# Patient Record
Sex: FEMALE | Race: WHITE | Employment: OTHER | URBAN - NONMETROPOLITAN AREA
[De-identification: names, ages, dates, MRNs, and addresses within clinical notes are randomized per-mention and may not be internally consistent; named-entity substitution may affect disease eponyms.]

---

## 2017-07-07 ENCOUNTER — OFFICE VISIT (OUTPATIENT)
Dept: UROLOGY | Age: 72
End: 2017-07-07

## 2017-07-07 VITALS
BODY MASS INDEX: 35.05 KG/M2 | DIASTOLIC BLOOD PRESSURE: 78 MMHG | WEIGHT: 167 LBS | SYSTOLIC BLOOD PRESSURE: 140 MMHG | HEIGHT: 58 IN

## 2017-07-07 DIAGNOSIS — N13.5 UPJ OBSTRUCTION, ACQUIRED: ICD-10-CM

## 2017-07-07 DIAGNOSIS — N20.0 KIDNEY STONE: Primary | ICD-10-CM

## 2017-07-07 LAB
BILIRUBIN URINE: NEGATIVE
BLOOD URINE, POC: NORMAL
CHARACTER, URINE: CLEAR
COLOR, URINE: YELLOW
GLUCOSE URINE: NEGATIVE MG/DL
KETONES, URINE: NEGATIVE
LEUKOCYTE CLUMPS, URINE: NEGATIVE
NITRITE, URINE: NEGATIVE
PH, URINE: 5.5
PROTEIN, URINE: NEGATIVE MG/DL
SPECIFIC GRAVITY, URINE: <= 1.005 (ref 1–1.03)
UROBILINOGEN, URINE: 0.2 EU/DL

## 2017-07-07 PROCEDURE — G8427 DOCREV CUR MEDS BY ELIG CLIN: HCPCS | Performed by: UROLOGY

## 2017-07-07 PROCEDURE — 3017F COLORECTAL CA SCREEN DOC REV: CPT | Performed by: UROLOGY

## 2017-07-07 PROCEDURE — 4040F PNEUMOC VAC/ADMIN/RCVD: CPT | Performed by: UROLOGY

## 2017-07-07 PROCEDURE — 1036F TOBACCO NON-USER: CPT | Performed by: UROLOGY

## 2017-07-07 PROCEDURE — G8417 CALC BMI ABV UP PARAM F/U: HCPCS | Performed by: UROLOGY

## 2017-07-07 PROCEDURE — G8400 PT W/DXA NO RESULTS DOC: HCPCS | Performed by: UROLOGY

## 2017-07-07 PROCEDURE — 3014F SCREEN MAMMO DOC REV: CPT | Performed by: UROLOGY

## 2017-07-07 PROCEDURE — 1123F ACP DISCUSS/DSCN MKR DOCD: CPT | Performed by: UROLOGY

## 2017-07-07 PROCEDURE — 81003 URINALYSIS AUTO W/O SCOPE: CPT | Performed by: UROLOGY

## 2017-07-07 PROCEDURE — 1090F PRES/ABSN URINE INCON ASSESS: CPT | Performed by: UROLOGY

## 2017-07-07 PROCEDURE — 99203 OFFICE O/P NEW LOW 30 MIN: CPT | Performed by: UROLOGY

## 2017-07-07 RX ORDER — AMOXICILLIN 500 MG
CAPSULE ORAL EVERY MORNING
COMMUNITY

## 2017-07-07 RX ORDER — TURMERIC 95 %
POWDER (GRAM) MISCELLANEOUS EVERY OTHER DAY
COMMUNITY
End: 2022-02-21

## 2017-07-07 ASSESSMENT — ENCOUNTER SYMPTOMS
SHORTNESS OF BREATH: 0
ABDOMINAL PAIN: 0
EYE PAIN: 0
VOMITING: 0
FACIAL SWELLING: 0
EYE REDNESS: 0
CHEST TIGHTNESS: 0
COLOR CHANGE: 0

## 2017-07-14 LAB — CREAT SERPL-MCNC: 0.75 MG/DL

## 2017-07-20 ENCOUNTER — HOSPITAL ENCOUNTER (OUTPATIENT)
Dept: NUCLEAR MEDICINE | Age: 72
Discharge: HOME | End: 2017-07-20
Payer: MEDICARE

## 2017-07-20 ENCOUNTER — HOSPITAL ENCOUNTER (OUTPATIENT)
Age: 72
Discharge: HOME | End: 2017-07-20
Payer: MEDICARE

## 2017-07-20 ENCOUNTER — HOSPITAL ENCOUNTER (OUTPATIENT)
Dept: GENERAL RADIOLOGY | Age: 72
Discharge: HOME | End: 2017-07-20
Payer: MEDICARE

## 2017-07-20 VITALS — WEIGHT: 160 LBS | HEIGHT: 58 IN | BODY MASS INDEX: 33.58 KG/M2

## 2017-07-20 DIAGNOSIS — N20.0 KIDNEY STONE: ICD-10-CM

## 2017-07-20 DIAGNOSIS — N13.5 UPJ OBSTRUCTION, ACQUIRED: ICD-10-CM

## 2017-07-20 PROCEDURE — 3430000000 HC RX DIAGNOSTIC RADIOPHARMACEUTICAL: Performed by: UROLOGY

## 2017-07-20 PROCEDURE — 78708 K FLOW/FUNCT IMAGE W/DRUG: CPT

## 2017-07-20 PROCEDURE — 6360000002 HC RX W HCPCS

## 2017-07-20 PROCEDURE — 74000 XR ABDOMEN LIMITED (KUB): CPT

## 2017-07-20 PROCEDURE — A9562 TC99M MERTIATIDE: HCPCS | Performed by: UROLOGY

## 2017-07-20 RX ORDER — FUROSEMIDE 10 MG/ML
40 INJECTION INTRAMUSCULAR; INTRAVENOUS ONCE
Status: COMPLETED | OUTPATIENT
Start: 2017-07-20 | End: 2017-07-20

## 2017-07-20 RX ADMIN — Medication 10.3 MILLICURIE: at 07:58

## 2017-07-20 RX ADMIN — FUROSEMIDE 40 MG: 10 INJECTION INTRAMUSCULAR; INTRAVENOUS at 08:11

## 2017-07-21 ENCOUNTER — OFFICE VISIT (OUTPATIENT)
Dept: UROLOGY | Age: 72
End: 2017-07-21
Payer: MEDICARE

## 2017-07-21 ENCOUNTER — TELEPHONE (OUTPATIENT)
Dept: UROLOGY | Age: 72
End: 2017-07-21

## 2017-07-21 VITALS
SYSTOLIC BLOOD PRESSURE: 132 MMHG | BODY MASS INDEX: 34.85 KG/M2 | HEIGHT: 58 IN | DIASTOLIC BLOOD PRESSURE: 66 MMHG | WEIGHT: 166 LBS

## 2017-07-21 DIAGNOSIS — Z01.818 PRE-OP TESTING: ICD-10-CM

## 2017-07-21 DIAGNOSIS — N20.0 KIDNEY STONE: Primary | ICD-10-CM

## 2017-07-21 LAB
BILIRUBIN URINE: NEGATIVE
BLOOD URINE, POC: NORMAL
CHARACTER, URINE: CLEAR
COLOR, URINE: YELLOW
GLUCOSE URINE: NEGATIVE MG/DL
KETONES, URINE: NEGATIVE
LEUKOCYTE CLUMPS, URINE: NEGATIVE
NITRITE, URINE: NEGATIVE
PH, URINE: 6
PROTEIN, URINE: NEGATIVE MG/DL
SPECIFIC GRAVITY, URINE: 1.02 (ref 1–1.03)
UROBILINOGEN, URINE: 0.2 EU/DL

## 2017-07-21 PROCEDURE — G8427 DOCREV CUR MEDS BY ELIG CLIN: HCPCS | Performed by: UROLOGY

## 2017-07-21 PROCEDURE — G8400 PT W/DXA NO RESULTS DOC: HCPCS | Performed by: UROLOGY

## 2017-07-21 PROCEDURE — 1123F ACP DISCUSS/DSCN MKR DOCD: CPT | Performed by: UROLOGY

## 2017-07-21 PROCEDURE — 99213 OFFICE O/P EST LOW 20 MIN: CPT | Performed by: UROLOGY

## 2017-07-21 PROCEDURE — 1090F PRES/ABSN URINE INCON ASSESS: CPT | Performed by: UROLOGY

## 2017-07-21 PROCEDURE — 3014F SCREEN MAMMO DOC REV: CPT | Performed by: UROLOGY

## 2017-07-21 PROCEDURE — G8417 CALC BMI ABV UP PARAM F/U: HCPCS | Performed by: UROLOGY

## 2017-07-21 PROCEDURE — 4040F PNEUMOC VAC/ADMIN/RCVD: CPT | Performed by: UROLOGY

## 2017-07-21 PROCEDURE — 81003 URINALYSIS AUTO W/O SCOPE: CPT | Performed by: UROLOGY

## 2017-07-21 PROCEDURE — 1036F TOBACCO NON-USER: CPT | Performed by: UROLOGY

## 2017-07-21 PROCEDURE — 3017F COLORECTAL CA SCREEN DOC REV: CPT | Performed by: UROLOGY

## 2017-07-25 ENCOUNTER — TELEPHONE (OUTPATIENT)
Dept: UROLOGY | Age: 72
End: 2017-07-25

## 2017-07-25 DIAGNOSIS — N20.0 KIDNEY STONE: Primary | ICD-10-CM

## 2017-07-27 ENCOUNTER — TELEPHONE (OUTPATIENT)
Dept: UROLOGY | Age: 72
End: 2017-07-27

## 2017-07-28 ENCOUNTER — HOSPITAL ENCOUNTER (OUTPATIENT)
Age: 72
Discharge: HOME | End: 2017-07-28
Payer: MEDICARE

## 2017-07-28 DIAGNOSIS — N20.0 KIDNEY STONE: ICD-10-CM

## 2017-07-28 DIAGNOSIS — Z01.818 PRE-OP TESTING: ICD-10-CM

## 2017-07-28 LAB
BASOPHILS ABSOLUTE: 100 /ΜL
BASOPHILS RELATIVE PERCENT: 0.8 %
BILIRUBIN URINE: NORMAL MG/DL
BLOOD, URINE: NEGATIVE
BUN BLDV-MCNC: 17 MG/DL
CALCIUM SERPL-MCNC: 9.8 MG/DL
CHLORIDE BLD-SCNC: 105 MMOL/L
CLARITY: CLEAR
CO2: 26 MMOL/L
COLOR: NORMAL
CREAT SERPL-MCNC: 0.72 MG/DL
EOSINOPHILS ABSOLUTE: 200 /ΜL
EOSINOPHILS RELATIVE PERCENT: 3.2 %
GFR CALCULATED: NORMAL
GLUCOSE BLD-MCNC: 86 MG/DL
GLUCOSE URINE: NEGATIVE
HCT VFR BLD CALC: 44.3 % (ref 36–46)
HEMOGLOBIN: 14.6 G/DL (ref 12–16)
KETONES, URINE: NEGATIVE
LEUKOCYTE ESTERASE, URINE: NEGATIVE
LYMPHOCYTES ABSOLUTE: 2100 /ΜL
LYMPHOCYTES RELATIVE PERCENT: 29.2 %
MCH RBC QN AUTO: 28.5 PG
MCHC RBC AUTO-ENTMCNC: 33 G/DL
MCV RBC AUTO: 86.4 FL
MONOCYTES ABSOLUTE: 800 /ΜL
MONOCYTES RELATIVE PERCENT: 10.6 %
NEUTROPHILS ABSOLUTE: 4100 /ΜL
NEUTROPHILS RELATIVE PERCENT: 56.2 %
NITRITE, URINE: NEGATIVE
PDW BLD-RTO: 14.1 %
PH UA: 5 (ref 4.5–8)
PLATELET # BLD: 197 K/ΜL
PMV BLD AUTO: NORMAL FL
POTASSIUM SERPL-SCNC: 4 MMOL/L
PROTEIN UA: NEGATIVE
RBC # BLD: 5.13 10^6/ΜL
SODIUM BLD-SCNC: 137 MMOL/L
SPECIFIC GRAVITY UA: 1.01 (ref 1–1.03)
UROBILINOGEN, URINE: NORMAL
WBC # BLD: 7.2 10^3/ML

## 2017-07-28 PROCEDURE — 93005 ELECTROCARDIOGRAM TRACING: CPT

## 2017-07-29 LAB
EKG ATRIAL RATE: 67 BPM
EKG P AXIS: 55 DEGREES
EKG P-R INTERVAL: 140 MS
EKG Q-T INTERVAL: 360 MS
EKG QRS DURATION: 78 MS
EKG QTC CALCULATION (BAZETT): 380 MS
EKG R AXIS: 14 DEGREES
EKG T AXIS: 39 DEGREES
EKG VENTRICULAR RATE: 67 BPM

## 2017-08-01 ENCOUNTER — TELEPHONE (OUTPATIENT)
Dept: UROLOGY | Age: 72
End: 2017-08-01

## 2017-08-03 ENCOUNTER — ANESTHESIA EVENT (OUTPATIENT)
Dept: OPERATING ROOM | Age: 72
End: 2017-08-03
Payer: MEDICARE

## 2017-08-03 ENCOUNTER — HOSPITAL ENCOUNTER (OUTPATIENT)
Age: 72
Setting detail: OUTPATIENT SURGERY
Discharge: HOME | End: 2017-08-03
Attending: UROLOGY | Admitting: UROLOGY
Payer: MEDICARE

## 2017-08-03 ENCOUNTER — ANESTHESIA (OUTPATIENT)
Dept: OPERATING ROOM | Age: 72
End: 2017-08-03
Payer: MEDICARE

## 2017-08-03 VITALS
WEIGHT: 167 LBS | DIASTOLIC BLOOD PRESSURE: 63 MMHG | RESPIRATION RATE: 16 BRPM | TEMPERATURE: 97.2 F | HEART RATE: 56 BPM | SYSTOLIC BLOOD PRESSURE: 128 MMHG | BODY MASS INDEX: 35.05 KG/M2 | OXYGEN SATURATION: 93 % | HEIGHT: 58 IN

## 2017-08-03 VITALS
RESPIRATION RATE: 3 BRPM | TEMPERATURE: 96.8 F | SYSTOLIC BLOOD PRESSURE: 78 MMHG | OXYGEN SATURATION: 98 % | DIASTOLIC BLOOD PRESSURE: 41 MMHG

## 2017-08-03 DIAGNOSIS — N20.0 KIDNEY STONE: Primary | ICD-10-CM

## 2017-08-03 LAB — INR BLD: 0.94 (ref 0.85–1.13)

## 2017-08-03 PROCEDURE — 3700000000 HC ANESTHESIA ATTENDED CARE: Performed by: UROLOGY

## 2017-08-03 PROCEDURE — 7100000000 HC PACU RECOVERY - FIRST 15 MIN: Performed by: UROLOGY

## 2017-08-03 PROCEDURE — 6360000002 HC RX W HCPCS: Performed by: NURSE ANESTHETIST, CERTIFIED REGISTERED

## 2017-08-03 PROCEDURE — 7100000010 HC PHASE II RECOVERY - FIRST 15 MIN: Performed by: UROLOGY

## 2017-08-03 PROCEDURE — 2580000003 HC RX 258: Performed by: UROLOGY

## 2017-08-03 PROCEDURE — 3600000002 HC SURGERY LEVEL 2 BASE: Performed by: UROLOGY

## 2017-08-03 PROCEDURE — 36415 COLL VENOUS BLD VENIPUNCTURE: CPT

## 2017-08-03 PROCEDURE — 6360000002 HC RX W HCPCS: Performed by: UROLOGY

## 2017-08-03 PROCEDURE — 3700000001 HC ADD 15 MINUTES (ANESTHESIA): Performed by: UROLOGY

## 2017-08-03 PROCEDURE — 7100000011 HC PHASE II RECOVERY - ADDTL 15 MIN: Performed by: UROLOGY

## 2017-08-03 PROCEDURE — 50590 FRAGMENTING OF KIDNEY STONE: CPT | Performed by: UROLOGY

## 2017-08-03 PROCEDURE — 3600000012 HC SURGERY LEVEL 2 ADDTL 15MIN: Performed by: UROLOGY

## 2017-08-03 PROCEDURE — 7100000001 HC PACU RECOVERY - ADDTL 15 MIN: Performed by: UROLOGY

## 2017-08-03 PROCEDURE — 85610 PROTHROMBIN TIME: CPT

## 2017-08-03 RX ORDER — PROPOFOL 10 MG/ML
INJECTION, EMULSION INTRAVENOUS PRN
Status: DISCONTINUED | OUTPATIENT
Start: 2017-08-03 | End: 2017-08-03 | Stop reason: SDUPTHER

## 2017-08-03 RX ORDER — CIPROFLOXACIN 2 MG/ML
400 INJECTION, SOLUTION INTRAVENOUS ONCE
Status: COMPLETED | OUTPATIENT
Start: 2017-08-03 | End: 2017-08-03

## 2017-08-03 RX ORDER — SODIUM CHLORIDE 9 MG/ML
INJECTION, SOLUTION INTRAVENOUS CONTINUOUS
Status: DISCONTINUED | OUTPATIENT
Start: 2017-08-03 | End: 2017-08-03 | Stop reason: HOSPADM

## 2017-08-03 RX ORDER — HYDROCHLOROTHIAZIDE 12.5 MG/1
12.5 TABLET ORAL DAILY
Qty: 30 TABLET | Refills: 0 | Status: SHIPPED | OUTPATIENT
Start: 2017-08-03 | End: 2022-02-21

## 2017-08-03 RX ORDER — FENTANYL CITRATE 50 UG/ML
INJECTION, SOLUTION INTRAMUSCULAR; INTRAVENOUS PRN
Status: DISCONTINUED | OUTPATIENT
Start: 2017-08-03 | End: 2017-08-03 | Stop reason: SDUPTHER

## 2017-08-03 RX ORDER — HYDROCODONE BITARTRATE AND ACETAMINOPHEN 5; 325 MG/1; MG/1
1 TABLET ORAL EVERY 6 HOURS PRN
Qty: 20 TABLET | Refills: 0 | Status: SHIPPED | OUTPATIENT
Start: 2017-08-03 | End: 2017-08-10

## 2017-08-03 RX ORDER — ONDANSETRON 2 MG/ML
INJECTION INTRAMUSCULAR; INTRAVENOUS PRN
Status: DISCONTINUED | OUTPATIENT
Start: 2017-08-03 | End: 2017-08-03 | Stop reason: SDUPTHER

## 2017-08-03 RX ORDER — DEXAMETHASONE SODIUM PHOSPHATE 4 MG/ML
INJECTION, SOLUTION INTRA-ARTICULAR; INTRALESIONAL; INTRAMUSCULAR; INTRAVENOUS; SOFT TISSUE PRN
Status: DISCONTINUED | OUTPATIENT
Start: 2017-08-03 | End: 2017-08-03 | Stop reason: SDUPTHER

## 2017-08-03 RX ADMIN — FENTANYL CITRATE 50 MCG: 50 INJECTION INTRAMUSCULAR; INTRAVENOUS at 12:15

## 2017-08-03 RX ADMIN — PHENYLEPHRINE HYDROCHLORIDE 100 MCG: 10 INJECTION INTRAMUSCULAR; INTRAVENOUS; SUBCUTANEOUS at 12:15

## 2017-08-03 RX ADMIN — PHENYLEPHRINE HYDROCHLORIDE 100 MCG: 10 INJECTION INTRAMUSCULAR; INTRAVENOUS; SUBCUTANEOUS at 12:08

## 2017-08-03 RX ADMIN — DEXAMETHASONE SODIUM PHOSPHATE 4 MG: 4 INJECTION, SOLUTION INTRAMUSCULAR; INTRAVENOUS at 12:04

## 2017-08-03 RX ADMIN — SODIUM CHLORIDE: 9 INJECTION, SOLUTION INTRAVENOUS at 11:50

## 2017-08-03 RX ADMIN — ONDANSETRON 4 MG: 2 INJECTION INTRAMUSCULAR; INTRAVENOUS at 12:04

## 2017-08-03 RX ADMIN — LIDOCAINE HYDROCHLORIDE 60 MG: 20 INJECTION, SOLUTION INTRAVENOUS at 11:59

## 2017-08-03 RX ADMIN — PHENYLEPHRINE HYDROCHLORIDE 50 MCG: 10 INJECTION INTRAMUSCULAR; INTRAVENOUS; SUBCUTANEOUS at 12:11

## 2017-08-03 RX ADMIN — FENTANYL CITRATE 25 MCG: 50 INJECTION INTRAMUSCULAR; INTRAVENOUS at 12:04

## 2017-08-03 RX ADMIN — FENTANYL CITRATE 25 MCG: 50 INJECTION INTRAMUSCULAR; INTRAVENOUS at 12:01

## 2017-08-03 RX ADMIN — CIPROFLOXACIN 400 MG: 2 INJECTION, SOLUTION INTRAVENOUS at 12:02

## 2017-08-03 RX ADMIN — PROPOFOL 150 MG: 10 INJECTION, EMULSION INTRAVENOUS at 11:59

## 2017-08-03 RX ADMIN — PHENYLEPHRINE HYDROCHLORIDE 100 MCG: 10 INJECTION INTRAMUSCULAR; INTRAVENOUS; SUBCUTANEOUS at 12:14

## 2017-08-03 ASSESSMENT — PULMONARY FUNCTION TESTS
PIF_VALUE: 4
PIF_VALUE: 2
PIF_VALUE: 2
PIF_VALUE: 6
PIF_VALUE: 4
PIF_VALUE: 2
PIF_VALUE: 4
PIF_VALUE: 0
PIF_VALUE: 4
PIF_VALUE: 2
PIF_VALUE: 4
PIF_VALUE: 3
PIF_VALUE: 4
PIF_VALUE: 13
PIF_VALUE: 3
PIF_VALUE: 13
PIF_VALUE: 14
PIF_VALUE: 5
PIF_VALUE: 1
PIF_VALUE: 1
PIF_VALUE: 5
PIF_VALUE: 4
PIF_VALUE: 0
PIF_VALUE: 4
PIF_VALUE: 3
PIF_VALUE: 4
PIF_VALUE: 4
PIF_VALUE: 1
PIF_VALUE: 4
PIF_VALUE: 2

## 2017-08-03 ASSESSMENT — PAIN SCALES - WONG BAKER: WONGBAKER_NUMERICALRESPONSE: 0

## 2017-08-03 ASSESSMENT — PAIN SCALES - GENERAL
PAINLEVEL_OUTOF10: 0

## 2017-08-03 ASSESSMENT — PAIN - FUNCTIONAL ASSESSMENT: PAIN_FUNCTIONAL_ASSESSMENT: 0-10

## 2017-08-15 ENCOUNTER — HOSPITAL ENCOUNTER (OUTPATIENT)
Dept: GENERAL RADIOLOGY | Age: 72
Discharge: HOME | End: 2017-08-15
Payer: MEDICARE

## 2017-08-15 ENCOUNTER — HOSPITAL ENCOUNTER (OUTPATIENT)
Age: 72
Discharge: HOME | End: 2017-08-15
Payer: MEDICARE

## 2017-08-15 DIAGNOSIS — N20.0 KIDNEY STONE: ICD-10-CM

## 2017-08-15 PROCEDURE — 74000 XR ABDOMEN LIMITED (KUB): CPT

## 2017-08-16 ENCOUNTER — OFFICE VISIT (OUTPATIENT)
Dept: UROLOGY | Age: 72
End: 2017-08-16
Payer: MEDICARE

## 2017-08-16 VITALS
HEIGHT: 58 IN | WEIGHT: 166 LBS | SYSTOLIC BLOOD PRESSURE: 118 MMHG | BODY MASS INDEX: 34.85 KG/M2 | DIASTOLIC BLOOD PRESSURE: 78 MMHG

## 2017-08-16 DIAGNOSIS — N20.0 KIDNEY STONE: Primary | ICD-10-CM

## 2017-08-16 LAB
BILIRUBIN URINE: NEGATIVE
BLOOD URINE, POC: NORMAL
CHARACTER, URINE: CLEAR
COLOR, URINE: YELLOW
GLUCOSE URINE: NEGATIVE MG/DL
KETONES, URINE: NEGATIVE
LEUKOCYTE CLUMPS, URINE: NEGATIVE
NITRITE, URINE: NEGATIVE
PH, URINE: 6
PROTEIN, URINE: NEGATIVE MG/DL
SPECIFIC GRAVITY, URINE: <= 1.005 (ref 1–1.03)
UROBILINOGEN, URINE: 0.2 EU/DL

## 2017-08-16 PROCEDURE — 99024 POSTOP FOLLOW-UP VISIT: CPT | Performed by: UROLOGY

## 2017-08-16 PROCEDURE — 81003 URINALYSIS AUTO W/O SCOPE: CPT | Performed by: UROLOGY

## 2017-08-17 ENCOUNTER — TELEPHONE (OUTPATIENT)
Dept: UROLOGY | Age: 72
End: 2017-08-17

## 2017-08-18 ENCOUNTER — TELEPHONE (OUTPATIENT)
Dept: UROLOGY | Age: 72
End: 2017-08-18

## 2017-08-18 DIAGNOSIS — R31.9 HEMATURIA: ICD-10-CM

## 2017-08-18 DIAGNOSIS — N20.0 KIDNEY STONE: Primary | ICD-10-CM

## 2017-08-18 DIAGNOSIS — Z01.818 PRE-OP TESTING: ICD-10-CM

## 2017-09-01 ENCOUNTER — ANESTHESIA (OUTPATIENT)
Dept: OPERATING ROOM | Age: 72
End: 2017-09-01
Payer: MEDICARE

## 2017-09-01 ENCOUNTER — HOSPITAL ENCOUNTER (OUTPATIENT)
Age: 72
Setting detail: OUTPATIENT SURGERY
Discharge: HOME | End: 2017-09-01
Attending: UROLOGY | Admitting: UROLOGY
Payer: MEDICARE

## 2017-09-01 ENCOUNTER — HOSPITAL ENCOUNTER (OUTPATIENT)
Age: 72
Discharge: HOME | End: 2017-09-01
Payer: MEDICARE

## 2017-09-01 ENCOUNTER — HOSPITAL ENCOUNTER (OUTPATIENT)
Dept: GENERAL RADIOLOGY | Age: 72
Discharge: HOME | End: 2017-09-01
Payer: MEDICARE

## 2017-09-01 ENCOUNTER — ANESTHESIA EVENT (OUTPATIENT)
Dept: OPERATING ROOM | Age: 72
End: 2017-09-01
Payer: MEDICARE

## 2017-09-01 VITALS
DIASTOLIC BLOOD PRESSURE: 60 MMHG | RESPIRATION RATE: 16 BRPM | HEIGHT: 58 IN | SYSTOLIC BLOOD PRESSURE: 126 MMHG | OXYGEN SATURATION: 98 % | BODY MASS INDEX: 34.68 KG/M2 | WEIGHT: 165.2 LBS | TEMPERATURE: 98.2 F | HEART RATE: 56 BPM

## 2017-09-01 VITALS
OXYGEN SATURATION: 98 % | SYSTOLIC BLOOD PRESSURE: 90 MMHG | DIASTOLIC BLOOD PRESSURE: 44 MMHG | TEMPERATURE: 98.6 F | RESPIRATION RATE: 11 BRPM

## 2017-09-01 DIAGNOSIS — N20.0 KIDNEY STONE: Primary | ICD-10-CM

## 2017-09-01 DIAGNOSIS — Z01.818 PRE-OP TESTING: ICD-10-CM

## 2017-09-01 DIAGNOSIS — N20.0 KIDNEY STONE: ICD-10-CM

## 2017-09-01 DIAGNOSIS — R31.9 HEMATURIA: ICD-10-CM

## 2017-09-01 LAB
INR BLD: 0.91 (ref 0.85–1.13)
POTASSIUM SERPL-SCNC: 3.8 MEQ/L (ref 3.5–5.2)

## 2017-09-01 PROCEDURE — 7100000010 HC PHASE II RECOVERY - FIRST 15 MIN: Performed by: UROLOGY

## 2017-09-01 PROCEDURE — 2500000003 HC RX 250 WO HCPCS: Performed by: NURSE ANESTHETIST, CERTIFIED REGISTERED

## 2017-09-01 PROCEDURE — 36415 COLL VENOUS BLD VENIPUNCTURE: CPT

## 2017-09-01 PROCEDURE — 7100000011 HC PHASE II RECOVERY - ADDTL 15 MIN: Performed by: UROLOGY

## 2017-09-01 PROCEDURE — 74000 XR ABDOMEN LIMITED (KUB): CPT

## 2017-09-01 PROCEDURE — 3700000000 HC ANESTHESIA ATTENDED CARE: Performed by: UROLOGY

## 2017-09-01 PROCEDURE — 7100000001 HC PACU RECOVERY - ADDTL 15 MIN: Performed by: UROLOGY

## 2017-09-01 PROCEDURE — 85610 PROTHROMBIN TIME: CPT

## 2017-09-01 PROCEDURE — 84132 ASSAY OF SERUM POTASSIUM: CPT

## 2017-09-01 PROCEDURE — 6360000002 HC RX W HCPCS: Performed by: ANESTHESIOLOGY

## 2017-09-01 PROCEDURE — 3600000012 HC SURGERY LEVEL 2 ADDTL 15MIN: Performed by: UROLOGY

## 2017-09-01 PROCEDURE — 50590 FRAGMENTING OF KIDNEY STONE: CPT | Performed by: UROLOGY

## 2017-09-01 PROCEDURE — 2580000003 HC RX 258: Performed by: UROLOGY

## 2017-09-01 PROCEDURE — 7100000000 HC PACU RECOVERY - FIRST 15 MIN: Performed by: UROLOGY

## 2017-09-01 PROCEDURE — 6360000002 HC RX W HCPCS: Performed by: NURSE ANESTHETIST, CERTIFIED REGISTERED

## 2017-09-01 PROCEDURE — 3700000001 HC ADD 15 MINUTES (ANESTHESIA): Performed by: UROLOGY

## 2017-09-01 PROCEDURE — 6360000002 HC RX W HCPCS: Performed by: UROLOGY

## 2017-09-01 PROCEDURE — 3600000002 HC SURGERY LEVEL 2 BASE: Performed by: UROLOGY

## 2017-09-01 RX ORDER — LIDOCAINE HYDROCHLORIDE 20 MG/ML
INJECTION, SOLUTION EPIDURAL; INFILTRATION; INTRACAUDAL; PERINEURAL PRN
Status: DISCONTINUED | OUTPATIENT
Start: 2017-09-01 | End: 2017-09-01 | Stop reason: SDUPTHER

## 2017-09-01 RX ORDER — DIPHENHYDRAMINE HYDROCHLORIDE 50 MG/ML
12.5 INJECTION INTRAMUSCULAR; INTRAVENOUS
Status: DISCONTINUED | OUTPATIENT
Start: 2017-09-01 | End: 2017-09-01

## 2017-09-01 RX ORDER — FENTANYL CITRATE 50 UG/ML
50 INJECTION, SOLUTION INTRAMUSCULAR; INTRAVENOUS EVERY 5 MIN PRN
Status: DISCONTINUED | OUTPATIENT
Start: 2017-09-01 | End: 2017-09-01

## 2017-09-01 RX ORDER — MEPERIDINE HYDROCHLORIDE 25 MG/ML
25 INJECTION INTRAMUSCULAR; INTRAVENOUS; SUBCUTANEOUS
Status: DISCONTINUED | OUTPATIENT
Start: 2017-09-01 | End: 2017-09-01

## 2017-09-01 RX ORDER — ONDANSETRON 2 MG/ML
INJECTION INTRAMUSCULAR; INTRAVENOUS PRN
Status: DISCONTINUED | OUTPATIENT
Start: 2017-09-01 | End: 2017-09-01 | Stop reason: SDUPTHER

## 2017-09-01 RX ORDER — SODIUM CHLORIDE 9 MG/ML
INJECTION, SOLUTION INTRAVENOUS CONTINUOUS
Status: DISCONTINUED | OUTPATIENT
Start: 2017-09-01 | End: 2017-09-01 | Stop reason: HOSPADM

## 2017-09-01 RX ORDER — DEXAMETHASONE SODIUM PHOSPHATE 4 MG/ML
INJECTION, SOLUTION INTRA-ARTICULAR; INTRALESIONAL; INTRAMUSCULAR; INTRAVENOUS; SOFT TISSUE PRN
Status: DISCONTINUED | OUTPATIENT
Start: 2017-09-01 | End: 2017-09-01 | Stop reason: SDUPTHER

## 2017-09-01 RX ORDER — HYDROCODONE BITARTRATE AND ACETAMINOPHEN 5; 325 MG/1; MG/1
1 TABLET ORAL EVERY 6 HOURS PRN
Qty: 20 TABLET | Refills: 0 | Status: SHIPPED | OUTPATIENT
Start: 2017-09-01 | End: 2017-09-08

## 2017-09-01 RX ORDER — PROMETHAZINE HYDROCHLORIDE 25 MG/ML
12.5 INJECTION, SOLUTION INTRAMUSCULAR; INTRAVENOUS
Status: DISCONTINUED | OUTPATIENT
Start: 2017-09-01 | End: 2017-09-01

## 2017-09-01 RX ORDER — CIPROFLOXACIN 2 MG/ML
400 INJECTION, SOLUTION INTRAVENOUS ONCE
Status: COMPLETED | OUTPATIENT
Start: 2017-09-01 | End: 2017-09-01

## 2017-09-01 RX ORDER — FENTANYL CITRATE 50 UG/ML
25 INJECTION, SOLUTION INTRAMUSCULAR; INTRAVENOUS EVERY 5 MIN PRN
Status: DISCONTINUED | OUTPATIENT
Start: 2017-09-01 | End: 2017-09-01

## 2017-09-01 RX ORDER — LABETALOL HYDROCHLORIDE 5 MG/ML
5 INJECTION, SOLUTION INTRAVENOUS EVERY 10 MIN PRN
Status: DISCONTINUED | OUTPATIENT
Start: 2017-09-01 | End: 2017-09-01

## 2017-09-01 RX ORDER — PROPOFOL 10 MG/ML
INJECTION, EMULSION INTRAVENOUS PRN
Status: DISCONTINUED | OUTPATIENT
Start: 2017-09-01 | End: 2017-09-01 | Stop reason: SDUPTHER

## 2017-09-01 RX ADMIN — SODIUM CHLORIDE: 9 INJECTION, SOLUTION INTRAVENOUS at 12:31

## 2017-09-01 RX ADMIN — FENTANYL CITRATE 50 MCG: 50 INJECTION INTRAMUSCULAR; INTRAVENOUS at 13:05

## 2017-09-01 RX ADMIN — LIDOCAINE HYDROCHLORIDE 100 MG: 20 INJECTION, SOLUTION EPIDURAL; INFILTRATION; INTRACAUDAL; PERINEURAL at 13:05

## 2017-09-01 RX ADMIN — SODIUM CHLORIDE: 9 INJECTION, SOLUTION INTRAVENOUS at 13:03

## 2017-09-01 RX ADMIN — FENTANYL CITRATE 50 MCG: 50 INJECTION INTRAMUSCULAR; INTRAVENOUS at 13:35

## 2017-09-01 RX ADMIN — PROPOFOL 200 MG: 10 INJECTION, EMULSION INTRAVENOUS at 13:05

## 2017-09-01 RX ADMIN — PHENYLEPHRINE HYDROCHLORIDE 100 MCG: 10 INJECTION INTRAMUSCULAR; INTRAVENOUS; SUBCUTANEOUS at 13:27

## 2017-09-01 RX ADMIN — ONDANSETRON 4 MG: 2 INJECTION INTRAMUSCULAR; INTRAVENOUS at 13:24

## 2017-09-01 RX ADMIN — DEXAMETHASONE SODIUM PHOSPHATE 8 MG: 4 INJECTION, SOLUTION INTRAMUSCULAR; INTRAVENOUS at 13:24

## 2017-09-01 RX ADMIN — CIPROFLOXACIN 400 MG: 2 INJECTION, SOLUTION INTRAVENOUS at 13:07

## 2017-09-01 ASSESSMENT — PULMONARY FUNCTION TESTS
PIF_VALUE: 10
PIF_VALUE: 6
PIF_VALUE: 17
PIF_VALUE: 5
PIF_VALUE: 10
PIF_VALUE: 11
PIF_VALUE: 3
PIF_VALUE: 3
PIF_VALUE: 10
PIF_VALUE: 5
PIF_VALUE: 3
PIF_VALUE: 8
PIF_VALUE: 5
PIF_VALUE: 5
PIF_VALUE: 1
PIF_VALUE: 3
PIF_VALUE: 2
PIF_VALUE: 0
PIF_VALUE: 3
PIF_VALUE: 4
PIF_VALUE: 5
PIF_VALUE: 3
PIF_VALUE: 3
PIF_VALUE: 1
PIF_VALUE: 3
PIF_VALUE: 18
PIF_VALUE: 12
PIF_VALUE: 5
PIF_VALUE: 5
PIF_VALUE: 18
PIF_VALUE: 5
PIF_VALUE: 3
PIF_VALUE: 5
PIF_VALUE: 7
PIF_VALUE: 4
PIF_VALUE: 5
PIF_VALUE: 1
PIF_VALUE: 4

## 2017-09-01 ASSESSMENT — PAIN SCALES - GENERAL
PAINLEVEL_OUTOF10: 0
PAINLEVEL_OUTOF10: 1
PAINLEVEL_OUTOF10: 0

## 2017-09-14 ENCOUNTER — HOSPITAL ENCOUNTER (OUTPATIENT)
Dept: GENERAL RADIOLOGY | Age: 72
Discharge: HOME | End: 2017-09-14
Payer: MEDICARE

## 2017-09-14 ENCOUNTER — HOSPITAL ENCOUNTER (OUTPATIENT)
Age: 72
Discharge: HOME | End: 2017-09-14
Payer: MEDICARE

## 2017-09-14 DIAGNOSIS — N20.0 KIDNEY STONE: ICD-10-CM

## 2017-09-14 PROCEDURE — 74000 XR ABDOMEN LIMITED (KUB): CPT

## 2017-09-15 ENCOUNTER — OFFICE VISIT (OUTPATIENT)
Dept: UROLOGY | Age: 72
End: 2017-09-15
Payer: MEDICARE

## 2017-09-15 VITALS
HEIGHT: 59 IN | SYSTOLIC BLOOD PRESSURE: 122 MMHG | WEIGHT: 168 LBS | BODY MASS INDEX: 33.87 KG/M2 | DIASTOLIC BLOOD PRESSURE: 84 MMHG

## 2017-09-15 DIAGNOSIS — N20.0 KIDNEY STONE: Primary | ICD-10-CM

## 2017-09-15 PROCEDURE — 81003 URINALYSIS AUTO W/O SCOPE: CPT | Performed by: UROLOGY

## 2017-09-15 PROCEDURE — 99024 POSTOP FOLLOW-UP VISIT: CPT | Performed by: UROLOGY

## 2019-06-11 ENCOUNTER — HOSPITAL ENCOUNTER (OUTPATIENT)
Dept: INTERVENTIONAL RADIOLOGY/VASCULAR | Age: 74
Discharge: HOME | End: 2019-06-11

## 2019-06-11 DIAGNOSIS — Z13.6 ENCOUNTER FOR SCREENING FOR VASCULAR DISEASE: ICD-10-CM

## 2019-06-11 PROCEDURE — 9900000021 US VASCULAR SCREENING

## 2021-03-09 ENCOUNTER — NURSE ONLY (OUTPATIENT)
Dept: LAB | Age: 76
End: 2021-03-09

## 2021-06-28 ENCOUNTER — HOSPITAL ENCOUNTER (OUTPATIENT)
Dept: INTERVENTIONAL RADIOLOGY/VASCULAR | Age: 76
Discharge: HOME | End: 2021-06-28
Payer: MEDICARE

## 2021-06-28 DIAGNOSIS — R09.89 OTHER SPECIFIED SYMPTOMS AND SIGNS INVOLVING THE CIRCULATORY AND RESPIRATORY SYSTEMS: ICD-10-CM

## 2021-06-28 DIAGNOSIS — I65.29 OCCLUSION AND STENOSIS OF UNSPECIFIED CAROTID ARTERY: ICD-10-CM

## 2021-06-28 PROCEDURE — 93880 EXTRACRANIAL BILAT STUDY: CPT

## 2022-02-13 ENCOUNTER — HOSPITAL ENCOUNTER (EMERGENCY)
Age: 77
Discharge: HOME | End: 2022-02-13
Attending: EMERGENCY MEDICINE
Payer: MEDICARE

## 2022-02-13 ENCOUNTER — APPOINTMENT (OUTPATIENT)
Dept: CT IMAGING | Age: 77
End: 2022-02-13
Payer: MEDICARE

## 2022-02-13 VITALS
TEMPERATURE: 97.9 F | WEIGHT: 163 LBS | RESPIRATION RATE: 18 BRPM | HEART RATE: 73 BPM | BODY MASS INDEX: 35.17 KG/M2 | HEIGHT: 57 IN | OXYGEN SATURATION: 94 % | DIASTOLIC BLOOD PRESSURE: 59 MMHG | SYSTOLIC BLOOD PRESSURE: 101 MMHG

## 2022-02-13 DIAGNOSIS — N20.1 LEFT URETERAL CALCULUS: ICD-10-CM

## 2022-02-13 DIAGNOSIS — N23 RENAL COLIC ON LEFT SIDE: Primary | ICD-10-CM

## 2022-02-13 DIAGNOSIS — R31.9 HEMATURIA, UNSPECIFIED TYPE: ICD-10-CM

## 2022-02-13 LAB
ALBUMIN SERPL-MCNC: 4.3 G/DL (ref 3.5–5.1)
ALP BLD-CCNC: 111 U/L (ref 38–126)
ALT SERPL-CCNC: 25 U/L (ref 11–66)
ANION GAP SERPL CALCULATED.3IONS-SCNC: 13 MEQ/L (ref 8–16)
AST SERPL-CCNC: 17 U/L (ref 5–40)
BACTERIA: ABNORMAL /HPF
BASOPHILS # BLD: 0.4 %
BASOPHILS ABSOLUTE: 0 THOU/MM3 (ref 0–0.1)
BILIRUB SERPL-MCNC: 0.5 MG/DL (ref 0.3–1.2)
BILIRUBIN URINE: NEGATIVE
BLOOD, URINE: ABNORMAL
BUN BLDV-MCNC: 21 MG/DL (ref 7–22)
CALCIUM SERPL-MCNC: 10.4 MG/DL (ref 8.5–10.5)
CASTS 2: ABNORMAL /LPF
CASTS UA: ABNORMAL /LPF
CHARACTER, URINE: CLEAR
CHLORIDE BLD-SCNC: 107 MEQ/L (ref 98–111)
CO2: 21 MEQ/L (ref 23–33)
COLOR: YELLOW
CREAT SERPL-MCNC: 0.8 MG/DL (ref 0.4–1.2)
CRYSTALS, UA: ABNORMAL
EKG ATRIAL RATE: 64 BPM
EKG P AXIS: 58 DEGREES
EKG P-R INTERVAL: 146 MS
EKG Q-T INTERVAL: 420 MS
EKG QRS DURATION: 76 MS
EKG QTC CALCULATION (BAZETT): 433 MS
EKG R AXIS: 13 DEGREES
EKG T AXIS: 31 DEGREES
EKG VENTRICULAR RATE: 64 BPM
EOSINOPHIL # BLD: 0.1 %
EOSINOPHILS ABSOLUTE: 0 THOU/MM3 (ref 0–0.4)
EPITHELIAL CELLS, UA: ABNORMAL /HPF
ERYTHROCYTE [DISTWIDTH] IN BLOOD BY AUTOMATED COUNT: 13.9 % (ref 11.5–14.5)
ERYTHROCYTE [DISTWIDTH] IN BLOOD BY AUTOMATED COUNT: 46.6 FL (ref 35–45)
GFR SERPL CREATININE-BSD FRML MDRD: 70 ML/MIN/1.73M2
GLUCOSE BLD-MCNC: 175 MG/DL (ref 70–108)
GLUCOSE URINE: NEGATIVE MG/DL
HCT VFR BLD CALC: 47.4 % (ref 37–47)
HEMOGLOBIN: 15.4 GM/DL (ref 12–16)
IMMATURE GRANS (ABS): 0.05 THOU/MM3 (ref 0–0.07)
IMMATURE GRANULOCYTES: 0.4 %
KETONES, URINE: 40
LEUKOCYTE ESTERASE, URINE: ABNORMAL
LIPASE: 35 U/L (ref 5.6–51.3)
LYMPHOCYTES # BLD: 10.6 %
LYMPHOCYTES ABSOLUTE: 1.3 THOU/MM3 (ref 1–4.8)
MCH RBC QN AUTO: 29.5 PG (ref 26–33)
MCHC RBC AUTO-ENTMCNC: 32.5 GM/DL (ref 32.2–35.5)
MCV RBC AUTO: 90.8 FL (ref 81–99)
MISCELLANEOUS 2: ABNORMAL
MONOCYTES # BLD: 2.9 %
MONOCYTES ABSOLUTE: 0.3 THOU/MM3 (ref 0.4–1.3)
NITRITE, URINE: NEGATIVE
NUCLEATED RED BLOOD CELLS: 0 /100 WBC
OSMOLALITY CALCULATION: 288.5 MOSMOL/KG (ref 275–300)
PH UA: 5 (ref 5–9)
PLATELET # BLD: 241 THOU/MM3 (ref 130–400)
PMV BLD AUTO: 11.5 FL (ref 9.4–12.4)
POTASSIUM SERPL-SCNC: 4.5 MEQ/L (ref 3.5–5.2)
PROTEIN UA: 30
RBC # BLD: 5.22 MILL/MM3 (ref 4.2–5.4)
RBC URINE: ABNORMAL /HPF
REASON FOR REJECTION: NORMAL
REJECTED TEST: NORMAL
RENAL EPITHELIAL, UA: ABNORMAL
SEG NEUTROPHILS: 85.6 %
SEGMENTED NEUTROPHILS ABSOLUTE COUNT: 10.2 THOU/MM3 (ref 1.8–7.7)
SODIUM BLD-SCNC: 141 MEQ/L (ref 135–145)
SPECIFIC GRAVITY, URINE: 1.02 (ref 1–1.03)
TOTAL PROTEIN: 8 G/DL (ref 6.1–8)
TROPONIN T: < 0.01 NG/ML
UROBILINOGEN, URINE: 0.2 EU/DL (ref 0–1)
WBC # BLD: 11.9 THOU/MM3 (ref 4.8–10.8)
WBC UA: ABNORMAL /HPF
YEAST: ABNORMAL

## 2022-02-13 PROCEDURE — 84484 ASSAY OF TROPONIN QUANT: CPT

## 2022-02-13 PROCEDURE — 81001 URINALYSIS AUTO W/SCOPE: CPT

## 2022-02-13 PROCEDURE — 80053 COMPREHEN METABOLIC PANEL: CPT

## 2022-02-13 PROCEDURE — 87086 URINE CULTURE/COLONY COUNT: CPT

## 2022-02-13 PROCEDURE — 2580000003 HC RX 258: Performed by: EMERGENCY MEDICINE

## 2022-02-13 PROCEDURE — 74176 CT ABD & PELVIS W/O CONTRAST: CPT

## 2022-02-13 PROCEDURE — 99284 EMERGENCY DEPT VISIT MOD MDM: CPT

## 2022-02-13 PROCEDURE — 85025 COMPLETE CBC W/AUTO DIFF WBC: CPT

## 2022-02-13 PROCEDURE — 96375 TX/PRO/DX INJ NEW DRUG ADDON: CPT

## 2022-02-13 PROCEDURE — 96374 THER/PROPH/DIAG INJ IV PUSH: CPT

## 2022-02-13 PROCEDURE — 6370000000 HC RX 637 (ALT 250 FOR IP): Performed by: EMERGENCY MEDICINE

## 2022-02-13 PROCEDURE — 96372 THER/PROPH/DIAG INJ SC/IM: CPT

## 2022-02-13 PROCEDURE — 93005 ELECTROCARDIOGRAM TRACING: CPT | Performed by: EMERGENCY MEDICINE

## 2022-02-13 PROCEDURE — 83690 ASSAY OF LIPASE: CPT

## 2022-02-13 PROCEDURE — 93010 ELECTROCARDIOGRAM REPORT: CPT | Performed by: NUCLEAR MEDICINE

## 2022-02-13 PROCEDURE — 6360000002 HC RX W HCPCS: Performed by: EMERGENCY MEDICINE

## 2022-02-13 RX ORDER — ONDANSETRON 2 MG/ML
4 INJECTION INTRAMUSCULAR; INTRAVENOUS ONCE
Status: COMPLETED | OUTPATIENT
Start: 2022-02-13 | End: 2022-02-13

## 2022-02-13 RX ORDER — HYDROCODONE BITARTRATE AND ACETAMINOPHEN 5; 325 MG/1; MG/1
1 TABLET ORAL ONCE
Status: COMPLETED | OUTPATIENT
Start: 2022-02-13 | End: 2022-02-13

## 2022-02-13 RX ORDER — KETOROLAC TROMETHAMINE 30 MG/ML
15 INJECTION, SOLUTION INTRAMUSCULAR; INTRAVENOUS ONCE
Status: COMPLETED | OUTPATIENT
Start: 2022-02-13 | End: 2022-02-13

## 2022-02-13 RX ORDER — TAMSULOSIN HYDROCHLORIDE 0.4 MG/1
0.4 CAPSULE ORAL DAILY
Qty: 10 CAPSULE | Refills: 1 | Status: SHIPPED | OUTPATIENT
Start: 2022-02-13 | End: 2022-04-26

## 2022-02-13 RX ORDER — PROMETHAZINE HYDROCHLORIDE 25 MG/1
25 TABLET ORAL 3 TIMES DAILY PRN
Qty: 12 TABLET | Refills: 0 | Status: SHIPPED | OUTPATIENT
Start: 2022-02-13 | End: 2022-02-20

## 2022-02-13 RX ORDER — TAMSULOSIN HYDROCHLORIDE 0.4 MG/1
0.4 CAPSULE ORAL DAILY
Status: DISCONTINUED | OUTPATIENT
Start: 2022-02-13 | End: 2022-02-13

## 2022-02-13 RX ORDER — 0.9 % SODIUM CHLORIDE 0.9 %
1000 INTRAVENOUS SOLUTION INTRAVENOUS ONCE
Status: COMPLETED | OUTPATIENT
Start: 2022-02-13 | End: 2022-02-13

## 2022-02-13 RX ORDER — KETOROLAC TROMETHAMINE 10 MG/1
10 TABLET, FILM COATED ORAL EVERY 8 HOURS PRN
Qty: 15 TABLET | Refills: 0 | Status: SHIPPED | OUTPATIENT
Start: 2022-02-13 | End: 2022-02-21

## 2022-02-13 RX ORDER — PROMETHAZINE HYDROCHLORIDE 25 MG/ML
6.25 INJECTION, SOLUTION INTRAMUSCULAR; INTRAVENOUS ONCE
Status: COMPLETED | OUTPATIENT
Start: 2022-02-13 | End: 2022-02-13

## 2022-02-13 RX ORDER — HYDROCODONE BITARTRATE AND ACETAMINOPHEN 5; 325 MG/1; MG/1
1 TABLET ORAL EVERY 8 HOURS PRN
Qty: 10 TABLET | Refills: 0 | Status: SHIPPED | OUTPATIENT
Start: 2022-02-13 | End: 2022-02-16

## 2022-02-13 RX ADMIN — SODIUM CHLORIDE 1000 ML: 9 INJECTION, SOLUTION INTRAVENOUS at 05:00

## 2022-02-13 RX ADMIN — ONDANSETRON 4 MG: 2 INJECTION INTRAMUSCULAR; INTRAVENOUS at 05:01

## 2022-02-13 RX ADMIN — KETOROLAC TROMETHAMINE 15 MG: 30 INJECTION, SOLUTION INTRAMUSCULAR; INTRAVENOUS at 05:01

## 2022-02-13 RX ADMIN — HYDROCODONE BITARTRATE AND ACETAMINOPHEN 1 TABLET: 5; 325 TABLET ORAL at 07:58

## 2022-02-13 RX ADMIN — HYDROMORPHONE HYDROCHLORIDE 1 MG: 1 INJECTION, SOLUTION INTRAMUSCULAR; INTRAVENOUS; SUBCUTANEOUS at 06:51

## 2022-02-13 RX ADMIN — PROMETHAZINE HYDROCHLORIDE 6.25 MG: 25 INJECTION INTRAMUSCULAR; INTRAVENOUS at 06:49

## 2022-02-13 ASSESSMENT — PAIN DESCRIPTION - DESCRIPTORS
DESCRIPTORS: ACHING
DESCRIPTORS: DULL;STABBING

## 2022-02-13 ASSESSMENT — PAIN SCALES - GENERAL
PAINLEVEL_OUTOF10: 10
PAINLEVEL_OUTOF10: 1
PAINLEVEL_OUTOF10: 5
PAINLEVEL_OUTOF10: 2

## 2022-02-13 ASSESSMENT — PAIN DESCRIPTION - ORIENTATION
ORIENTATION: LEFT

## 2022-02-13 ASSESSMENT — PAIN DESCRIPTION - PAIN TYPE
TYPE: ACUTE PAIN

## 2022-02-13 ASSESSMENT — PAIN DESCRIPTION - LOCATION
LOCATION: BACK;FLANK
LOCATION: FLANK
LOCATION: FLANK

## 2022-02-13 ASSESSMENT — PAIN DESCRIPTION - ONSET: ONSET: ON-GOING

## 2022-02-13 ASSESSMENT — PAIN DESCRIPTION - FREQUENCY: FREQUENCY: CONTINUOUS

## 2022-02-13 NOTE — ED NOTES
Pt ambulated to restroom, urine sample collected. Pt medicated per MAR.       Timmy Estrada RN  02/13/22 8934

## 2022-02-13 NOTE — ED PROVIDER NOTES
[address] ENCOUNTER      PATIENT NAME: Wendy Olivares  MRN: [id number]  : 1945  THURSTON: 2022  PROVIDER: Makayla Chang MD      CHIEF COMPLAINT       Chief Complaint   Patient presents with    Flank Pain    Emesis       Patient is seen and evaluated in a timely fashion. Nurses Notes are reviewed and I agree except as noted in the HPI. HISTORY OF PRESENT ILLNESS    Wendy Olivares is a 78 y.o. female who presents to Emergency Department with Flank Pain and Emesis     A 30-year-old female with past medical history of renal calculi, hypertension, hypertension, morbid obesity, hypothyroidism, and breast cancer presents to ED for evaluation of left flank pain since yesterday. No hematuria. No dysuria. Patient has been having nausea and vomiting. Pain is persistent, colicky, at moderate intensity. Pain radiates to left groin. No fever or chills. No chest pain. No shortness of breath. This HPI was provided by patient. REVIEW OF SYSTEMS   Ten-point review of systems is negative except those documented in above HPI including constitutional, HEENT, respiratory, cardiovascular, gastrointestinal, genitourinary, musculoskeletal, skin, neurological, hematological and behavioral.      PAST MEDICAL HISTORY     Past Medical History:   Diagnosis Date    Cancer [facility])     breast ca    Fibroids     History of kidney stones     Hypertension     Thyroid disorder        SURGICAL HISTORY       Past Surgical History:   Procedure Laterality Date    BREAST LUMPECTOMY Left ?     Dr. Andrea Matthews Right     COLONOSCOPY      Dr. Nicolás Guerrero Left     nerve reattachment -Dr. Kelli Rhodes, [address] [place] [address] LITHOTRIPSY      x3 with Dr. Asia Ferris LITHOTRIPSY Right 8/3/2017    RIGHT ESWL EXTRACORPEAL SHOCK WAVE LITHOTRIPSY performed by Alfred Ambrose MD at [address] [place] [address] LITHOTRIPSY Left 2017    LEFT ESWL EXTRACORPEAL SHOCK WAVE LITHOTRIPSY performed by Georgia Jj MD at [address]       Previous Medications    GRAPE SEED EXTRACT PO    Take 150 mg by mouth every morning    HYDROCHLOROTHIAZIDE (HYDRODIURIL) 12.5 MG TABLET    Take 1 tablet by mouth daily for 30 doses    LEVOTHYROXINE (SYNTHROID) 75 MCG TABLET    Take 75 mcg by mouth Daily. LISINOPRIL (PRINIVIL;ZESTRIL) 20 MG TABLET    Take 20 mg by mouth daily. MILK THISTLE PO    Take by mouth every morning     MISC NATURAL PRODUCTS (GLUCOSAMINE CHONDROITIN MSM) TABS    Take 1 tablet by mouth 2 times daily    NONFORMULARY    Take by mouth every morning Indications: Thyroiod Care= Iodine with L-tyrosine     OLIVE LEAF PO    Take by mouth every morning     OMEGA-3 FATTY ACIDS (FISH OIL) 1200 MG CAPS    Take by mouth every morning     PROBIOTIC PRODUCT (PROBIOTIC DAILY PO)    Take by mouth every morning     TIMOLOL (BETIMOL) 0.5 % OPHTHALMIC SOLUTION    Place 1 drop into both eyes 2 times daily     TURMERIC, CURCUMA LONGA, (CURCUMIN EXTRACT) POWD    by Does not apply route every other day     UNABLE TO FIND    every morning Indications: Bone Up- heart and Bone health        ALLERGIES     Latex    FAMILY HISTORY     She indicated that her mother is . She indicated that her father is . She indicated that the status of her brother is unknown.   family history includes Heart Disease in her father; Kidney Disease in her brother. SOCIAL HISTORY      reports that she has never smoked. She has never used smokeless tobacco. She reports that she does not drink alcohol and does not use drugs. PHYSICAL EXAM      height is 4' 9\" (1.448 m) and weight is 163 lb (73.9 kg). Her oral temperature is 97.9 °F (36.6 °C). Her blood pressure is 101/59 (abnormal) and her pulse is 73. Her respiration is 18 and oxygen saturation is 94%. Physical Exam  Vitals and nursing note reviewed.    Constitutional: Appearance: She is well-developed. She is not diaphoretic. HENT:      Head: Normocephalic and atraumatic. Nose: Nose normal.   Eyes:      General: No scleral icterus. Right eye: No discharge. Left eye: No discharge. Conjunctiva/sclera: Conjunctivae normal.      Pupils: Pupils are equal, round, and reactive to light. Neck:      Vascular: No JVD. Trachea: No tracheal deviation. Cardiovascular:      Rate and Rhythm: Normal rate and regular rhythm. Heart sounds: Normal heart sounds. No murmur heard. No friction rub. No gallop. Pulmonary:      Effort: Pulmonary effort is normal. No respiratory distress. Breath sounds: Normal breath sounds. No stridor. No wheezing or rales. Chest:      Chest wall: No tenderness. Abdominal:      General: Bowel sounds are normal. There is no distension. Palpations: Abdomen is soft. There is no mass. Tenderness: There is no abdominal tenderness. There is no guarding or rebound. Hernia: No hernia is present. Musculoskeletal:         General: Tenderness present. No deformity. Cervical back: Normal range of motion and neck supple. Comments: Left CVA tenderness    Lymphadenopathy:      Cervical: No cervical adenopathy. Skin:     General: Skin is warm and dry. Capillary Refill: Capillary refill takes less than 2 seconds. Coloration: Skin is not pale. Findings: No erythema or rash. Neurological:      Mental Status: She is alert and oriented to person, place, and time. Cranial Nerves: No cranial nerve deficit. Sensory: No sensory deficit. Motor: No abnormal muscle tone. Coordination: Coordination normal.      Deep Tendon Reflexes: Reflexes normal.   Psychiatric:         Behavior: Behavior normal.         Thought Content: Thought content normal.         Judgment: Judgment normal.       ANCILLARY TEST RESULTS   EKG:    Interpreted by me  Normal sinus rhythm, ventricular rate of 64 bpm, NC interval 146 ms, QRS duration 76 ms,  ms, no ST elevation or acute T wave    LAB RESULTS:  Results for orders placed or performed during the hospital encounter of 02/13/22   CBC Auto Differential   Result Value Ref Range    WBC 11.9 (H) 4.8 - 10.8 thou/mm3    RBC 5.22 4.20 - 5.40 mill/mm3    Hemoglobin 15.4 12.0 - 16.0 gm/dl    Hematocrit 47.4 (H) 37.0 - 47.0 %    MCV 90.8 81.0 - 99.0 fL    MCH 29.5 26.0 - 33.0 pg    MCHC 32.5 32.2 - 35.5 gm/dl    RDW-CV 13.9 11.5 - 14.5 %    RDW-SD 46.6 (H) 35.0 - 45.0 fL    Platelets 124 420 - 836 thou/mm3    MPV 11.5 9.4 - 12.4 fL    Seg Neutrophils 85.6 %    Lymphocytes 10.6 %    Monocytes 2.9 %    Eosinophils 0.1 %    Basophils 0.4 %    Immature Granulocytes 0.4 %    Segs Absolute 10.2 (H) 1.8 - 7.7 thou/mm3    Lymphocytes Absolute 1.3 1.0 - 4.8 thou/mm3    Monocytes Absolute 0.3 (L) 0.4 - 1.3 thou/mm3    Eosinophils Absolute 0.0 0.0 - 0.4 thou/mm3    Basophils Absolute 0.0 0.0 - 0.1 thou/mm3    Immature Grans (Abs) 0.05 0.00 - 0.07 thou/mm3    nRBC 0 /100 wbc   SPECIMEN REJECTION   Result Value Ref Range    Rejected Test CMP, Lipas,Trop     Reason for Rejection see below    Comprehensive Metabolic Panel   Result Value Ref Range    Glucose 175 (H) 70 - 108 mg/dL    CREATININE 0.8 0.4 - 1.2 mg/dL    BUN 21 7 - 22 mg/dL    Sodium 141 135 - 145 meq/L    Potassium 4.5 3.5 - 5.2 meq/L    Chloride 107 98 - 111 meq/L    CO2 21 (L) 23 - 33 meq/L    Calcium 10.4 8.5 - 10.5 mg/dL    AST 17 5 - 40 U/L    Alkaline Phosphatase 111 38 - 126 U/L    Total Protein 8.0 6.1 - 8.0 g/dL    Albumin 4.3 3.5 - 5.1 g/dL    Total Bilirubin 0.5 0.3 - 1.2 mg/dL    ALT 25 11 - 66 U/L   Lipase   Result Value Ref Range    Lipase 35.0 5.6 - 51.3 U/L   Troponin   Result Value Ref Range    Troponin T < 0.010 ng/ml   Anion Gap   Result Value Ref Range    Anion Gap 13.0 8.0 - 16.0 meq/L   Glomerular Filtration Rate, Estimated   Result Value Ref Range    Est, Glom Filt Rate 70 (A) ml/min/1.73m2 and/or weight-based dosing   when appropriate to reduce radiation to a low as reasonably achievable. MEDICAL DEDISION MAKING (MDM)     Differential Diagnosis: Renal colic, UTI, pyelonephritis, pancreatitis, ACS, musculoskeletal pain, AAA    Initial Plans: Large-bore IV, basic labs, normal saline bolus, IV Toradol, CT abdomen pelvis    Summary:    Pain is better with ED treatment. Labs are reassuring. Normal BUN/Cr. UA neg for UTI. CT A/P: 1. Ureterolithiasis within the proximal left ureter measuring 5-6 mm resulting in proximal mild hydroureter and mild left hydronephrosis. Stranding along the course of the proximal left ureter. 2. Multiple nephrolithiasis involving the left kidney the largest of which measures up to 3 mm. 3. Nonobstructing 1-2 mm nephrolithiasis of the central right kidney. 4. Chronic and incidental findings of the abdomen and pelvis as above. Discussed with urology on call [facility]) who suggests a try of spontaneous pass. Discharged with Urology follow up appointment set up 2/15/22 and prescriptions of Norco, Toradol, Phenergan and Flomax. Warning symptoms that warrant coming back for recheck are discussed.      ED Medications  Medications   HYDROcodone-acetaminophen (NORCO) 5-325 MG per tablet 1 tablet (has no administration in time range)   ondansetron (ZOFRAN) injection 4 mg (4 mg IntraVENous Given 2/13/22 0501)   0.9 % sodium chloride bolus (0 mLs IntraVENous Stopped 2/13/22 0758)   ketorolac (TORADOL) injection 15 mg (15 mg IntraVENous Given 2/13/22 0501)   promethazine (PHENERGAN) injection 6.25 mg (6.25 mg IntraMUSCular Given 2/13/22 0649)   HYDROmorphone (DILAUDID) injection 1 mg (1 mg IntraVENous Given 2/13/22 0651)     Vital signs in ED  Vitals:    02/13/22 0425 02/13/22 0604 02/13/22 0648   BP: (!) 157/58 (!) 121/52 (!) 132/54   Pulse: 62 80 62   Resp: 18 16 18   Temp: 97.9 °F (36.6 °C)     TempSrc: Oral     SpO2: 100% 95% 94%   Weight: 163 lb (73.9 kg)     Height: 4' 9\" (1.448 m)         CRITICAL CARE   None    CONSULTS   None    PROCEDURES   None    FINAL IMPRESSION AND DISPOSITION      1. Renal colic on left side    2. Left ureteral calculus    3. Hematuria, unspecified type        DISPOSITION Discharge - Pending Orders Complete 02/13/2022 07:41:35 AM        PATIENT REFERRED TO:  SANTO SUNG II[facility]  [address] [place], [place] [address]  On 2/15/2022  at 1:00 PM. ED discharge follow up      DISCHARGE MEDICATIONS:  New Prescriptions    HYDROCODONE-ACETAMINOPHEN (NORCO) 5-325 MG PER TABLET    Take 1 tablet by mouth every 8 hours as needed for Pain for up to 3 days. Intended supply: 3 days.  Take lowest dose possible to manage pain    KETOROLAC (TORADOL) 10 MG TABLET    Take 1 tablet by mouth every 8 hours as needed for Pain    PROMETHAZINE (PHENERGAN) 25 MG TABLET    Take 1 tablet by mouth 3 times daily as needed for Nausea    TAMSULOSIN (FLOMAX) 0.4 MG CAPSULE    Take 1 capsule by mouth daily       (Please note that portions of this note were completed with a voice recognition program.  Efforts were made to edit the dictations but occasionally words aremis-transcribed.)    MD Johnnie Rosales MD  02/13/22 7777

## 2022-02-13 NOTE — ED NOTES
RN placed cold cloth to pt neck and forehead. Pt actively vomiting.       Kenia Arguello, AYLIN  02/13/22 0025

## 2022-02-13 NOTE — ED NOTES
Multiple attempts at IV insertion. Sheila Domingo, RN at bedside for ultrasound guided IV.       Jaida Serrano RN  02/13/22 2045

## 2022-02-14 LAB
ORGANISM: ABNORMAL
URINE CULTURE REFLEX: ABNORMAL

## 2022-02-15 ENCOUNTER — OFFICE VISIT (OUTPATIENT)
Dept: UROLOGY | Age: 77
End: 2022-02-15
Payer: MEDICARE

## 2022-02-15 ENCOUNTER — TELEPHONE (OUTPATIENT)
Dept: UROLOGY | Age: 77
End: 2022-02-15

## 2022-02-15 VITALS
WEIGHT: 163 LBS | BODY MASS INDEX: 35.17 KG/M2 | HEIGHT: 57 IN | SYSTOLIC BLOOD PRESSURE: 140 MMHG | DIASTOLIC BLOOD PRESSURE: 78 MMHG

## 2022-02-15 DIAGNOSIS — N20.0 KIDNEY STONE: Primary | ICD-10-CM

## 2022-02-15 LAB
BILIRUBIN URINE: NEGATIVE
BLOOD URINE, POC: ABNORMAL
CHARACTER, URINE: CLEAR
COLOR, URINE: YELLOW
GLUCOSE URINE: NEGATIVE MG/DL
KETONES, URINE: NEGATIVE
LEUKOCYTE CLUMPS, URINE: NEGATIVE
NITRITE, URINE: NEGATIVE
PH, URINE: 5.5 (ref 5–9)
PROTEIN, URINE: NEGATIVE MG/DL
SPECIFIC GRAVITY, URINE: <= 1.005 (ref 1–1.03)
UROBILINOGEN, URINE: 0.2 EU/DL (ref 0–1)

## 2022-02-15 PROCEDURE — 99204 OFFICE O/P NEW MOD 45 MIN: CPT | Performed by: NURSE PRACTITIONER

## 2022-02-15 PROCEDURE — 81003 URINALYSIS AUTO W/O SCOPE: CPT | Performed by: NURSE PRACTITIONER

## 2022-02-15 ASSESSMENT — ENCOUNTER SYMPTOMS
NAUSEA: 0
VOMITING: 0
ABDOMINAL PAIN: 0
BACK PAIN: 0

## 2022-02-15 NOTE — PROGRESS NOTES
[id number] [facility] [address] [place], [place] [id number]  Dept: [phone]  Loc: [phone]    Visit Date: 2/15/2022        HPI:     Ken Lantigua is a 78 y.o. female who presents today for:  Chief Complaint   Patient presents with    New Patient    Nephrolithiasis     ER f/u       HPI   Pt seen in referral by Dr. Carlos Mendez for kidney stone. Pt presented to ER on 2/13/22 with flank pain. CT imaging revealed a 6 mm L proximal ureteral caluclus with hydroureteronephrosis and bilateral punctate nonobstructive renal calculi. Crt 0.8. WBC 11.9. Urine culture resulted mixed growth. Reports pain was severe and associated with n/v and dry heaves on presentation to ER. Now she is having a dull ache 1/10 in left back. No further n/v. Afebrile. Using toradol and phenergan around the clock. Hasn't had to use narcotic. Current Outpatient Medications   Medication Sig Dispense Refill    OIL OF OREGANO PO Take by mouth daily      zinc 50 MG CAPS Take by mouth daily      Cholecalciferol (VITAMIN D3 PO) Take 5,000 Units by mouth daily      tamsulosin (FLOMAX) 0.4 MG capsule Take 1 capsule by mouth daily 10 capsule 1    HYDROcodone-acetaminophen (NORCO) 5-325 MG per tablet Take 1 tablet by mouth every 8 hours as needed for Pain for up to 3 days. Intended supply: 3 days.  Take lowest dose possible to manage pain 10 tablet 0    ketorolac (TORADOL) 10 MG tablet Take 1 tablet by mouth every 8 hours as needed for Pain 15 tablet 0    promethazine (PHENERGAN) 25 MG tablet Take 1 tablet by mouth 3 times daily as needed for Nausea 12 tablet 0    GRAPE SEED EXTRACT PO Take 150 mg by mouth every morning      Probiotic Product (PROBIOTIC DAILY PO) Take by mouth every morning       OLIVE LEAF PO Take by mouth every morning       MILK THISTLE PO Take by mouth every morning       Turmeric, Curcuma Longa, (CURCUMIN EXTRACT) POWD by Does not apply route every other day       NONFORMULARY Take by mouth every morning Indications: Thyroiod Care= Iodine with L-tyrosine       Omega-3 Fatty Acids (FISH OIL) 1200 MG CAPS Take by mouth every morning       lisinopril (PRINIVIL;ZESTRIL) 20 MG tablet Take 20 mg by mouth daily.  levothyroxine (SYNTHROID) 75 MCG tablet Take 75 mcg by mouth Daily.  timolol (BETIMOL) 0.5 % ophthalmic solution Place 1 drop into both eyes 2 times daily       hydrochlorothiazide (HYDRODIURIL) 12.5 MG tablet Take 1 tablet by mouth daily for 30 doses 30 tablet 0     No current facility-administered medications for this visit. Past Medical History  Dorota Garsia  has a past medical history of Cancer ([facility] [address] Fibroids, History of kidney stones, Hypertension, Shingles, and Thyroid disorder. Past Surgical History  The patient  has a past surgical history that includes Hysterectomy, total abdominal (1987); Breast lumpectomy (Left, 2004?); Carpal tunnel release (Right, 2007); Lithotripsy; [place] tooth extraction; Colonoscopy (2017); Hand surgery (Left); Lithotripsy (Right, 8/3/2017); and Lithotripsy (Left, 9/1/2017). Family History  This patient's family history includes Heart Disease in her father; Kidney Disease in her brother. Social History  Dorota Garsia  reports that she has never smoked. She has never used smokeless tobacco. She reports that she does not drink alcohol and does not use drugs. Subjective:      Review of Systems   Constitutional: Negative for activity change, appetite change, chills, diaphoresis, fatigue, fever and unexpected weight change. Gastrointestinal: Negative for abdominal pain, nausea and vomiting. Genitourinary: Negative for decreased urine volume, difficulty urinating, dysuria, flank pain, frequency, hematuria and urgency. Musculoskeletal: Negative for back pain. Objective:   BP (!) 140/78   Ht 4' 9\" (1.448 m)   Wt 163 lb (73.9 kg)   BMI 35.27 kg/m²     Physical Exam  Vitals reviewed. Constitutional:       General: She is not in acute distress. Appearance: Normal appearance. She is well-developed. She is not ill-appearing or diaphoretic. HENT:      Head: Normocephalic and atraumatic. Right Ear: External ear normal.      Left Ear: External ear normal.      Nose: Nose normal.      Mouth/Throat:      Mouth: Mucous membranes are moist.   Eyes:      General: No scleral icterus. Right eye: No discharge. Left eye: No discharge. Neck:      Vascular: No JVD. Trachea: No tracheal deviation. Cardiovascular:      Rate and Rhythm: Normal rate and regular rhythm. Pulmonary:      Effort: Pulmonary effort is normal. No respiratory distress. Breath sounds: Normal breath sounds. Abdominal:      General: There is no distension. Tenderness: There is no abdominal tenderness. There is no right CVA tenderness or left CVA tenderness. Musculoskeletal:         General: No tenderness. Normal range of motion. Neurological:      Mental Status: She is alert and oriented to person, place, and time. Mental status is at baseline. Psychiatric:         Mood and Affect: Mood normal.         Behavior: Behavior normal.         Thought Content:  Thought content normal.         POC  Results for POC orders placed in visit on 02/15/22   POCT Urinalysis No Micro (Auto)   Result Value Ref Range    Glucose, Ur Negative NEGATIVE mg/dl    Bilirubin Urine Negative     Ketones, Urine Negative NEGATIVE    Specific Gravity, Urine <= 1.005 1.002 - 1.030    Blood, UA POC Moderate (A) NEGATIVE    pH, Urine 5.50 5.0 - 9.0    Protein, Urine Negative NEGATIVE mg/dl    Urobilinogen, Urine 0.20 0.0 - 1.0 eu/dl    Nitrite, Urine Negative NEGATIVE    Leukocyte Clumps, Urine Negative NEGATIVE    Color, Urine Yellow [id number]    Character, Urine Clear [id number]         Patients recent PSA values are as follows  No results found for: PSA, PSADIA     Recent BUN/Creatinine:  Lab Results   Component Value Date    BUN 21 02/13/2022    CREATININE 0.8 02/13/2022       Radiology  The patient has had a CT abd/pelvis which I have independently reviewed along with its accompanying report. The study demonstrates bilateral nephrolithiasis and 5-6 mm left proximal ureteral calculus with mild hydroureteronephrosis. Assessment:   6 mm L ureteral calculus with hydronephrosis  N/V  L flank pain  Plan:     Pt with 6 mm L ureteral calculus with hydronephrosis. Pain improved but continues. Discussed possible TOP vs ureteroscopic treatment. Pt would like to schedule treatment next week unless she passes prior. I described the procedure in detail and also described the associated risks and benefits at length. We discussed possible alternative therapies. We discussed the risks and benefits of not undergoing therapy. Patient understands these risks and benefits and desires to proceed. Post-op expectations were discussed; stent pain, urinary frequency and urgency secondary to the stent, dysuria which should improve 1-2 days after procedure, and intermittent hematuria can be expected as long as stent is in place. Pt has a hx of stent discomfort. May benefit from black beauty stent    We discussed general stone prevention measures including increasing water intake to 3-4 liters per day to make 2.5 liters of urine per day, limiting animal protein intake to less than 9 ozs per day, watching sodium intake, and maintaining moderate calcium intake from food and not supplements with goal of 800-1200 mg/day. Pt provided with handout on kidney stone prevention. Stop Vit c. Pt will be scheduled for Cystoscopy, possible left ureteroscopy, possible laser lithotripsy, possible basket retrieval of stone fragments, possible left ureteral stent placement next week unless she passes stone prior.       Pt aware to present to ER for fever, intractable pain, intractable n/v.

## 2022-02-15 NOTE — TELEPHONE ENCOUNTER
No pre cert needed for CPT 78849 per [facility] PREETI with [employer]   Ref # [id number]

## 2022-02-15 NOTE — PATIENT INSTRUCTIONS
Kidney Stone Prevention    Kidney/Ureteral stones are formed by the normal chemicals which are present in the urine. If the urine gets super concentrated by any of the common ingredients e.g. Calcium, uric acid, oxalate, or phosphate, the stone is formed. The three most important changes, which you should make to your diet to prevent recurrence are as follows:      1. Drink at least 3 quarts of water a day, and even more in hot weather. You need to drink enough water to make at least two quarts of urine per day. 2. Limit your salt intake to no more than 3 grams per day. Remember that most of the processed foods are very high in sodium (canned, boxed, frozen, restaurant foods). When you eat large amounts of salty foods/snacks the kidney will eliminate that excess salt along with calcium, which is the most common composition of kidney stones. You do not have to limit your calcium intake (up to 1000 mg). Natural foods (low fat dairy products) as the source of calcium are preferred over pills and may in fact prevent stones. 3. Limit your animal protein (meat, fish, poultry). Try to limit meat consumption to no more than 8 ounces a day. Kidney stones are more common in people consuming large amounts of animal proteins, especially red meat. Increase vegetables/fruit consumption. Other strategies for stone prevention:  · Citric acid in urine prevents stone crystallization. Increase citrate consumption by drinking orange juice, lemonade, or diluted lemon juice. One good and inexpensive way to achieve this goal is by mixing 4 ounces of lemon juice in 2 liters of water to be used in a 24-hour period. · Limit oxalate consumption. Oxalate is one of the most common contents of kidney stones. Foods rich in oxalates are all types of nuts, tea, spinach, rhubarb, cranberry, chocolate, and black pepper.       Risk factors for stone formation:  · If your medical history includes gastric bypass surgery or resection of a large segment of bowel due to inflammatory bowel disease---you may have excessive oxalate absorption from the gut increasing your stone risk. Depending on the stone composition and or 24 hour urine tests, specific recommendations can be made to help prevent kidney stone formation in the future. · Obesity--Kidney stones are more common with obesity. Any weight reduction can be helpful. Do your best!!    Further testing:  · A 24 hour urine collection test called a Marissa Dean may be ordered by your physician at your follow-up appointment in the office. This test analyzes the elements present in your urine that may increase your risk for kidney stone formation. It allows your medical provider to pinpoint what specific dietary changes or medicine changes can be made to prevent future stones. · Stone analysis can be done on stone fragments retrieved during your procedure or from fragments that you pass in your urine to aid in prevention of future stones.

## 2022-02-15 NOTE — TELEPHONE ENCOUNTER
DO NOT TAKE ASPIRIN,  FISH OIL, COUMADIN, IBUPROFEN, MOTRIN-LIKE DRUGS AND ANY MULTIVITAMINS OR OVER THE COUNTER SUPPLEMENTS 14 DAYS PRIOR TO SURGERY. Pasha Mckeon 1945 Diagnosis:     Surgical Physician: Dr. Kathi Rubio have been scheduled for the procedure marked below:      Surgery: Cystoscopy, left ureteroscopy, laser lithotripsy, basket retrieval of stone fragments, and possible left ureteral stent placement        Date: 2/25/22     Anesthesia: Anesthesiologist (General/Spinal)     Place of Service: [facility] Second Floor Same Day Surgery         Arrive to same day surgery by:  6:30 am  (Surgery time is subject to change)      INSTRUCTIONS AS MARKED BELOW:    1.  DO NOT eat or drink anything after midnight before surgery. 2.  We prefer you shower or bathe with an antibacterial soap (Dial) the morning of surgery. 3.  Please ensure to have a  with you to transport you home. 4.  Please bring a current medication list, photo ID and insurance card(s) with you  5. Okay to take Tylenol  6. If you take Glucophage, Metformin or Janumet, hold 48-hours prior to surgery  7. Take blood pressure or heart medication as directed, if taken in the morning take with a small sip of water  8. The office will call you in 1-2 days after your procedure to schedule a follow up.              Date: 2/15/2022

## 2022-02-15 NOTE — TELEPHONE ENCOUNTER
SURGERY [address]  [address] [place] [address] [address] SANTO PETERSEN AM OFF[place]GG II.MIRA, Keyshawn Hardy [address]      Phone *[phone] *[phone]   Surgical Scheduling Direct Line Phone *[phone] Fax *[phone]      Jessica Wilhelm 1945 female    [address] [facility]   Marital Status: Single         Home Phone: [phone]      Cell Phone:    Telephone Information:   Mobile [phone]          Surgeon: Dr. Susanna Arthur Surgery Date: 2/25/22   Time: 8:30 am    Procedure: Cystoscopy, left ureteroscopy, laser lithotripsy, basket retrieval of stone fragments, and possible left ureteral stent placement    Diagnosis: Kidney Stone     Important Medical History:  In Epic    Special Inst/Equip:     CPT Codes:    24078  Latex Allergy: Yes     Cardiac Device:  No    Anesthesia:  General          Admission Type:  Same Day                        Admit Prior to Day of Surgery: No    Case Location:  Main OR            Preadmission Testing:  Phone Call          PAT Date and Time:______________________________________________________    PAT Confirmation #: ______________________________________________________    Post Op Visit: ___________________________________________________________    Need Preop Cardiac Clearance: No    Does Patient have Cardiologist/physician?      None    Surgery Confirmation #: __________________________________________________    : ________________________   Date: __________________________     [employer] Name: [facility]

## 2022-02-16 ENCOUNTER — PREP FOR PROCEDURE (OUTPATIENT)
Dept: UROLOGY | Age: 77
End: 2022-02-16

## 2022-02-16 RX ORDER — SODIUM CHLORIDE 9 MG/ML
INJECTION, SOLUTION INTRAVENOUS CONTINUOUS
Status: CANCELLED | OUTPATIENT
Start: 2022-02-25

## 2022-02-21 RX ORDER — KETOROLAC TROMETHAMINE 10 MG/1
10 TABLET, FILM COATED ORAL EVERY 6 HOURS PRN
COMMUNITY
End: 2022-04-26

## 2022-02-21 RX ORDER — PROMETHAZINE HYDROCHLORIDE 25 MG/1
25 TABLET ORAL EVERY 6 HOURS PRN
COMMUNITY
End: 2022-04-26

## 2022-02-21 NOTE — PROGRESS NOTES
In preparation for their surgical procedure above patient was screened for Obstructive Sleep Apnea (NAPOLEON) using the STOP-Bang Questionnaire by the Pre-Admission Testing department. This is a pre-surgical screening tool for patient safety and serves as a recommendation, this WILL NOT cause cancellation of surgery. STOP-Bang Questionnaire  * Do you currently see a pulmonologist?  No     If yes STOP, do not complete. Patient follows with  .    1. Do you snore loudly (able to be heard in the next room)? No    2. Do you often feel tired or sleepy during the daytime? No       3. Has anyone ever told you that you stop breathing during your sleep? No    4. Do you have or are you being treated for high blood pressure? No      5. BMI more than 35? BMI (Calculated): 35.3        Yes    6. Age over 48 years? 78 y.o. Yes    7. Neck Circumference greater than 17 inches for male or 16 inches for female? Measured           (visits only)            Not Applicable    8. Gender Male? No      TOTAL SCORE: 2    NAPOLEON - Low Risk : Yes to 0 - 2 questions  NAPOLEON - Intermediate Risk : Yes to 3 - 4 questions  NAPOLEON - High Risk : Yes to 5 - 8 questions    Adapted from:   STOP Questionnaire: A Tool to Screen Patients for Obstructive Sleep Apnea   JOYCE Blancas.ANDRADE.C., SHARLA Eubanks.B.B.S., Walter Gaona M.D., Louise Ordoñez. Lisa Fierro, Ph.D., SHARLA Nava.B.B.S., Analia Womack, M.Sc., Lizet Cruz M.D., Lolis Cobb. [facility]    Anesthesiology 2008; [id number]:239-59 Copyright 2008, the [address] of Anesthesiologists, [facility] 37.   ----------------------------------------------------------------------------------------------------------------

## 2022-02-21 NOTE — PROGRESS NOTES
Following instructions given to patient, who states understanding:     NPO after midnight  [employer] and 's license  Wear comfortable clean clothing  Do not bring jewelry   Shower night before and morning of surgery with a liquid antibacterial soap  Bring medications in original bottles   Follow all instructions given by your physician   needed at discharge  Call [phone] for any questions  Report to SDS on 2nd floor  If you would become ill prior to surgery, please call the surgeon  May have only 1 visitor accompany you for surgery  Please bring and wear mask

## 2022-02-25 ENCOUNTER — ANESTHESIA EVENT (OUTPATIENT)
Dept: OPERATING ROOM | Age: 77
End: 2022-02-25
Payer: MEDICARE

## 2022-02-25 ENCOUNTER — ANESTHESIA (OUTPATIENT)
Dept: OPERATING ROOM | Age: 77
End: 2022-02-25
Payer: MEDICARE

## 2022-02-25 ENCOUNTER — HOSPITAL ENCOUNTER (OUTPATIENT)
Age: 77
Setting detail: OUTPATIENT SURGERY
Discharge: HOME | End: 2022-02-25
Attending: UROLOGY | Admitting: UROLOGY
Payer: MEDICARE

## 2022-02-25 VITALS
BODY MASS INDEX: 34.39 KG/M2 | RESPIRATION RATE: 16 BRPM | HEART RATE: 68 BPM | WEIGHT: 159.4 LBS | TEMPERATURE: 98.3 F | OXYGEN SATURATION: 96 % | SYSTOLIC BLOOD PRESSURE: 140 MMHG | DIASTOLIC BLOOD PRESSURE: 65 MMHG | HEIGHT: 57 IN

## 2022-02-25 VITALS
OXYGEN SATURATION: 98 % | RESPIRATION RATE: 12 BRPM | DIASTOLIC BLOOD PRESSURE: 53 MMHG | SYSTOLIC BLOOD PRESSURE: 90 MMHG

## 2022-02-25 DIAGNOSIS — N20.0 KIDNEY STONE: Primary | ICD-10-CM

## 2022-02-25 PROCEDURE — 3700000000 HC ANESTHESIA ATTENDED CARE: Performed by: UROLOGY

## 2022-02-25 PROCEDURE — C2617 STENT, NON-COR, TEM W/O DEL: HCPCS | Performed by: UROLOGY

## 2022-02-25 PROCEDURE — 3600000003 HC SURGERY LEVEL 3 BASE: Performed by: UROLOGY

## 2022-02-25 PROCEDURE — C1769 GUIDE WIRE: HCPCS | Performed by: UROLOGY

## 2022-02-25 PROCEDURE — 2500000003 HC RX 250 WO HCPCS: Performed by: NURSE ANESTHETIST, CERTIFIED REGISTERED

## 2022-02-25 PROCEDURE — 2709999900 HC NON-CHARGEABLE SUPPLY: Performed by: UROLOGY

## 2022-02-25 PROCEDURE — 3600000013 HC SURGERY LEVEL 3 ADDTL 15MIN: Performed by: UROLOGY

## 2022-02-25 PROCEDURE — 7100000000 HC PACU RECOVERY - FIRST 15 MIN: Performed by: UROLOGY

## 2022-02-25 PROCEDURE — 6360000002 HC RX W HCPCS: Performed by: UROLOGY

## 2022-02-25 PROCEDURE — 2720000010 HC SURG SUPPLY STERILE: Performed by: UROLOGY

## 2022-02-25 PROCEDURE — 3700000001 HC ADD 15 MINUTES (ANESTHESIA): Performed by: UROLOGY

## 2022-02-25 PROCEDURE — 7100000010 HC PHASE II RECOVERY - FIRST 15 MIN: Performed by: UROLOGY

## 2022-02-25 PROCEDURE — 2580000003 HC RX 258: Performed by: UROLOGY

## 2022-02-25 PROCEDURE — 7100000011 HC PHASE II RECOVERY - ADDTL 15 MIN: Performed by: UROLOGY

## 2022-02-25 PROCEDURE — 6360000002 HC RX W HCPCS: Performed by: NURSE ANESTHETIST, CERTIFIED REGISTERED

## 2022-02-25 PROCEDURE — 7100000001 HC PACU RECOVERY - ADDTL 15 MIN: Performed by: UROLOGY

## 2022-02-25 PROCEDURE — 6370000000 HC RX 637 (ALT 250 FOR IP): Performed by: UROLOGY

## 2022-02-25 DEVICE — URETERAL STENT
Type: IMPLANTABLE DEVICE | Status: FUNCTIONAL
Brand: PERCUFLEX™ PLUS

## 2022-02-25 RX ORDER — FENTANYL CITRATE 50 UG/ML
INJECTION, SOLUTION INTRAMUSCULAR; INTRAVENOUS PRN
Status: DISCONTINUED | OUTPATIENT
Start: 2022-02-25 | End: 2022-02-25 | Stop reason: SDUPTHER

## 2022-02-25 RX ORDER — FENTANYL CITRATE 50 UG/ML
50 INJECTION, SOLUTION INTRAMUSCULAR; INTRAVENOUS EVERY 5 MIN PRN
Status: DISCONTINUED | OUTPATIENT
Start: 2022-02-25 | End: 2022-02-25 | Stop reason: HOSPADM

## 2022-02-25 RX ORDER — SODIUM CHLORIDE 0.9 % (FLUSH) 0.9 %
5-40 SYRINGE (ML) INJECTION EVERY 12 HOURS SCHEDULED
Status: DISCONTINUED | OUTPATIENT
Start: 2022-02-25 | End: 2022-02-25 | Stop reason: HOSPADM

## 2022-02-25 RX ORDER — HYDROCODONE BITARTRATE AND ACETAMINOPHEN 5; 325 MG/1; MG/1
1 TABLET ORAL EVERY 6 HOURS PRN
Qty: 18 TABLET | Refills: 0 | Status: SHIPPED | OUTPATIENT
Start: 2022-02-25 | End: 2022-03-02

## 2022-02-25 RX ORDER — DEXAMETHASONE SODIUM PHOSPHATE 10 MG/ML
INJECTION, EMULSION INTRAMUSCULAR; INTRAVENOUS PRN
Status: DISCONTINUED | OUTPATIENT
Start: 2022-02-25 | End: 2022-02-25 | Stop reason: SDUPTHER

## 2022-02-25 RX ORDER — PROPOFOL 10 MG/ML
INJECTION, EMULSION INTRAVENOUS PRN
Status: DISCONTINUED | OUTPATIENT
Start: 2022-02-25 | End: 2022-02-25 | Stop reason: SDUPTHER

## 2022-02-25 RX ORDER — SODIUM CHLORIDE 9 MG/ML
INJECTION, SOLUTION INTRAVENOUS CONTINUOUS
Status: DISCONTINUED | OUTPATIENT
Start: 2022-02-25 | End: 2022-02-25 | Stop reason: HOSPADM

## 2022-02-25 RX ORDER — DIPHENHYDRAMINE HYDROCHLORIDE 50 MG/ML
12.5 INJECTION INTRAMUSCULAR; INTRAVENOUS
Status: DISCONTINUED | OUTPATIENT
Start: 2022-02-25 | End: 2022-02-25 | Stop reason: HOSPADM

## 2022-02-25 RX ORDER — OXYBUTYNIN CHLORIDE 10 MG/1
10 TABLET, EXTENDED RELEASE ORAL DAILY
Qty: 7 TABLET | Refills: 0 | Status: SHIPPED | OUTPATIENT
Start: 2022-02-25 | End: 2022-04-26

## 2022-02-25 RX ORDER — TAMSULOSIN HYDROCHLORIDE 0.4 MG/1
0.4 CAPSULE ORAL DAILY
Qty: 10 CAPSULE | Refills: 0 | Status: SHIPPED | OUTPATIENT
Start: 2022-02-25 | End: 2022-04-26

## 2022-02-25 RX ORDER — PHENAZOPYRIDINE HYDROCHLORIDE 200 MG/1
200 TABLET, FILM COATED ORAL ONCE
Status: COMPLETED | OUTPATIENT
Start: 2022-02-25 | End: 2022-02-25

## 2022-02-25 RX ORDER — MIDAZOLAM HYDROCHLORIDE 1 MG/ML
INJECTION INTRAMUSCULAR; INTRAVENOUS PRN
Status: DISCONTINUED | OUTPATIENT
Start: 2022-02-25 | End: 2022-02-25 | Stop reason: SDUPTHER

## 2022-02-25 RX ORDER — CEFAZOLIN SODIUM 2 G/100ML
2000 INJECTION, SOLUTION INTRAVENOUS
Status: COMPLETED | OUTPATIENT
Start: 2022-02-25 | End: 2022-02-25

## 2022-02-25 RX ORDER — ONDANSETRON 4 MG/1
4 TABLET, FILM COATED ORAL EVERY 8 HOURS PRN
Qty: 15 TABLET | Refills: 0 | Status: SHIPPED | OUTPATIENT
Start: 2022-02-25 | End: 2022-04-26

## 2022-02-25 RX ORDER — LIDOCAINE HCL/PF 100 MG/5ML
SYRINGE (ML) INJECTION PRN
Status: DISCONTINUED | OUTPATIENT
Start: 2022-02-25 | End: 2022-02-25 | Stop reason: SDUPTHER

## 2022-02-25 RX ORDER — MORPHINE SULFATE 2 MG/ML
2 INJECTION, SOLUTION INTRAMUSCULAR; INTRAVENOUS EVERY 5 MIN PRN
Status: DISCONTINUED | OUTPATIENT
Start: 2022-02-25 | End: 2022-02-25 | Stop reason: HOSPADM

## 2022-02-25 RX ORDER — SODIUM CHLORIDE 0.9 % (FLUSH) 0.9 %
5-40 SYRINGE (ML) INJECTION PRN
Status: DISCONTINUED | OUTPATIENT
Start: 2022-02-25 | End: 2022-02-25 | Stop reason: HOSPADM

## 2022-02-25 RX ORDER — SODIUM CHLORIDE 9 MG/ML
25 INJECTION, SOLUTION INTRAVENOUS PRN
Status: DISCONTINUED | OUTPATIENT
Start: 2022-02-25 | End: 2022-02-25 | Stop reason: HOSPADM

## 2022-02-25 RX ORDER — ONDANSETRON 2 MG/ML
4 INJECTION INTRAMUSCULAR; INTRAVENOUS
Status: DISCONTINUED | OUTPATIENT
Start: 2022-02-25 | End: 2022-02-25 | Stop reason: HOSPADM

## 2022-02-25 RX ORDER — PHENYLEPHRINE HYDROCHLORIDE 10 MG/ML
INJECTION INTRAVENOUS PRN
Status: DISCONTINUED | OUTPATIENT
Start: 2022-02-25 | End: 2022-02-25 | Stop reason: SDUPTHER

## 2022-02-25 RX ORDER — ONDANSETRON 2 MG/ML
INJECTION INTRAMUSCULAR; INTRAVENOUS PRN
Status: DISCONTINUED | OUTPATIENT
Start: 2022-02-25 | End: 2022-02-25 | Stop reason: SDUPTHER

## 2022-02-25 RX ORDER — PHENAZOPYRIDINE HYDROCHLORIDE 200 MG/1
200 TABLET, FILM COATED ORAL 3 TIMES DAILY PRN
Qty: 9 TABLET | Refills: 1 | Status: SHIPPED | OUTPATIENT
Start: 2022-02-25 | End: 2022-02-28

## 2022-02-25 RX ORDER — MEPERIDINE HYDROCHLORIDE 25 MG/ML
12.5 INJECTION INTRAMUSCULAR; INTRAVENOUS; SUBCUTANEOUS EVERY 5 MIN PRN
Status: DISCONTINUED | OUTPATIENT
Start: 2022-02-25 | End: 2022-02-25 | Stop reason: HOSPADM

## 2022-02-25 RX ADMIN — FENTANYL CITRATE 50 MCG: 50 INJECTION, SOLUTION INTRAMUSCULAR; INTRAVENOUS at 08:16

## 2022-02-25 RX ADMIN — PHENYLEPHRINE HYDROCHLORIDE 150 MCG: 10 INJECTION INTRAVENOUS at 08:32

## 2022-02-25 RX ADMIN — PROPOFOL 150 MG: 10 INJECTION, EMULSION INTRAVENOUS at 08:16

## 2022-02-25 RX ADMIN — MIDAZOLAM 2 MG: 1 INJECTION INTRAMUSCULAR; INTRAVENOUS at 08:12

## 2022-02-25 RX ADMIN — CEFAZOLIN SODIUM 2000 MG: 2 INJECTION, SOLUTION INTRAVENOUS at 08:15

## 2022-02-25 RX ADMIN — Medication 100 MG: at 08:16

## 2022-02-25 RX ADMIN — ONDANSETRON 4 MG: 2 INJECTION INTRAMUSCULAR; INTRAVENOUS at 08:29

## 2022-02-25 RX ADMIN — PHENAZOPYRIDINE HYDROCHLORIDE 200 MG: 200 TABLET ORAL at 09:43

## 2022-02-25 RX ADMIN — DEXAMETHASONE SODIUM PHOSPHATE 8 MG: 10 INJECTION, EMULSION INTRAMUSCULAR; INTRAVENOUS at 08:29

## 2022-02-25 RX ADMIN — FENTANYL CITRATE 50 MCG: 50 INJECTION, SOLUTION INTRAMUSCULAR; INTRAVENOUS at 08:48

## 2022-02-25 RX ADMIN — SODIUM CHLORIDE: 9 INJECTION, SOLUTION INTRAVENOUS at 07:20

## 2022-02-25 ASSESSMENT — PULMONARY FUNCTION TESTS
PIF_VALUE: 1
PIF_VALUE: 3
PIF_VALUE: 10
PIF_VALUE: 2
PIF_VALUE: 10
PIF_VALUE: 12
PIF_VALUE: 10
PIF_VALUE: 1
PIF_VALUE: 10
PIF_VALUE: 0
PIF_VALUE: 10
PIF_VALUE: 0
PIF_VALUE: 10
PIF_VALUE: 2
PIF_VALUE: 10
PIF_VALUE: 3
PIF_VALUE: 10
PIF_VALUE: 2
PIF_VALUE: 4
PIF_VALUE: 3
PIF_VALUE: 14

## 2022-02-25 ASSESSMENT — PAIN DESCRIPTION - PAIN TYPE: TYPE: ACUTE PAIN;SURGICAL PAIN

## 2022-02-25 ASSESSMENT — PAIN - FUNCTIONAL ASSESSMENT: PAIN_FUNCTIONAL_ASSESSMENT: 0-10

## 2022-02-25 ASSESSMENT — PAIN DESCRIPTION - LOCATION: LOCATION: THROAT

## 2022-02-25 ASSESSMENT — PAIN SCALES - GENERAL: PAINLEVEL_OUTOF10: 3

## 2022-02-25 NOTE — PROGRESS NOTES

## 2022-02-25 NOTE — H&P
Katrin Otero MD  History and Physical    Patient:  Wendy Olivares  MRN: [id number]  YOB: 1945    HISTORY OF PRESENT ILLNESS:     The patient is a 78 y.o. female who presents with left ureteral stone proximal. Here for procedure. Patient's old records, notes and chart reviewed and summarized above. Katrin Otero MD independently reviewed the images and verified the radiology reports from:    CT ABDOMEN PELVIS WO CONTRAST Additional Contrast? None    Result Date: 2/13/2022  CT abdomen and pelvis without contrast Comparison: None Findings: Granuloma within the medial aspect of the left lung base and lingula. Subsegmental atelectatic change involving the lung bases. A small hiatal hernia. The abdominal aorta is unremarkable. A hypodensity within the left lobe of the liver statistically representing a hepatic cyst. The gallbladder, pancreas, spleen, bilateral adrenal glands are all unremarkable. Nonobstructing 1-2 mm nephrolithiasis of the central right kidney. Multiple nephrolithiasis involving the left kidney the largest of which measures up to 3 mm. Ureterolithiasis within the proximal left ureter measuring 5-6 mm resulting in proximal mild hydroureter and mild left hydronephrosis. Stranding along the course of the proximal left ureter. Urinary bladder is partially nondistended. No distal obstructing ureterolithiasis. Pelvic contents unremarkable. No bowel obstruction, pneumoperitoneum, or pneumatosis. Mild colonic fecal burden greatest at the level of the cecum. The appendix is normal. The bones are intact. Mild spondylosis of the lumbar spine. Schmorl's node and superior endplate deformity of F95. Bilateral fat-containing inguinal hernias. 1. Ureterolithiasis within the proximal left ureter measuring 5-6 mm resulting in proximal mild hydroureter and mild left hydronephrosis. Stranding along the course of the proximal left ureter 2.  Multiple nephrolithiasis involving the left kidney the largest of which measures up to 3 mm. 3. Nonobstructing 1-2 mm nephrolithiasis of the central right kidney. 4. Chronic and incidental findings of the abdomen and pelvis as above. This document has been electronically signed by: Flory Ray DO, MBA on 02/13/2022 06:10 AM All CTs at this facility use dose modulation techniques and iterative reconstructions, and/or weight-based dosing when appropriate to reduce radiation to a low as reasonably achievable. Past Medical History:    Past Medical History:   Diagnosis Date    Cancer [facility])     breast ca    Fibroids     History of kidney stones     Hypertension     Kidney stones     Shingles 03/2021    Thyroid disorder        Past Surgical History:    Past Surgical History:   Procedure Laterality Date    BREAST LUMPECTOMY Left 2004? Dr. Lyubov Ruiz Right 2007    COLONOSCOPY  2017    Dr. Allison Ronquillo Left     nerve reattachment -Dr. Rita Bridges, [facility]    LITHOTRIPSY      x3 with Dr. Zara Love LITHOTRIPSY Right 8/3/2017    RIGHT ESWL EXTRACORPEAL SHOCK WAVE LITHOTRIPSY performed by Scott Soriano MD at [address] [facility] LITHOTRIPSY Left 9/1/2017    LEFT ESWL [address] LITHOTRIPSY performed by Scott Soriano MD at [facility]         Medications Prior to Admission:    Prior to Admission medications    Medication Sig Start Date End Date Taking?  Authorizing Provider   ketorolac (TORADOL) 10 MG tablet Take 10 mg by mouth every 6 hours as needed for Pain   Yes Historical Provider, MD   promethazine (PHENERGAN) 25 MG tablet Take 25 mg by mouth every 6 hours as needed for Nausea   Yes Historical Provider, MD   OIL OF OREGANO PO Take by mouth daily   Yes Historical Provider, MD   zinc 50 MG CAPS Take by mouth daily   Yes Historical Provider, MD   Cholecalciferol (VITAMIN D3 PO) Take 5,000 Units by mouth daily   Yes Historical Provider, MD   tamsulosin [facility]) 0.4 MG capsule Take 1 capsule by mouth daily 2/13/22  Yes Annetta Emerson MD   GRAPE SEED EXTRACT PO Take 150 mg by mouth every morning   Yes Historical Provider, MD   Probiotic Product (PROBIOTIC DAILY PO) Take by mouth every morning    Yes Historical Provider, MD   OLIVE LEAF PO Take by mouth every morning    Yes Historical Provider, MD   MILK THISTLE PO Take by mouth every morning    Yes Historical Provider, MD   Omega-3 Fatty Acids (FISH OIL) 1200 MG CAPS Take by mouth every morning    Yes Historical Provider, MD   lisinopril (PRINIVIL;ZESTRIL) 20 MG tablet Take 20 mg by mouth daily. Yes Historical Provider, MD   levothyroxine (SYNTHROID) 75 MCG tablet Take 75 mcg by mouth Daily. Yes Historical Provider, MD   timolol (BETIMOL) 0.5 % ophthalmic solution Place 1 drop into both eyes 2 times daily    Yes Historical Provider, MD       Allergies:  Latex    Social History:    Social History     Socioeconomic History    Marital status: Single     Spouse name: Not on file    Number of children: Not on file    Years of education: Not on file    Highest education level: Not on file   Occupational History    Not on file   Tobacco Use    Smoking status: Never Smoker    Smokeless tobacco: Never Used   Substance and Sexual Activity    Alcohol use: No    Drug use: No    Sexual activity: Not on file   Other Topics Concern    Not on file   Social History Narrative    Not on file     Social Determinants of Health     Financial Resource Strain:     Difficulty of Paying Living Expenses: Not on file   Food Insecurity:     Worried About Running Out of Food in the Last Year: Not on file    Tia of Food in the Last Year: Not on file   Transportation Needs:     Lack of Transportation (Medical): Not on file    Lack of Transportation (Non-Medical):  Not on file   Physical Activity:     Days of Exercise per Week: Not on file    Minutes of Exercise per Session: Not on file   Stress:     Feeling of Stress : Not on file Social Connections:     Frequency of Communication with Friends and Family: Not on file    Frequency of Social Gatherings with Friends and Family: Not on file    Attends Alevism Services: Not on file    Active Member of Clubs or Organizations: Not on file    Attends Club or Organization Meetings: Not on file    Marital Status: Not on file   Intimate Partner Violence:     Fear of Current or Ex-Partner: Not on file    Emotionally Abused: Not on file    Physically Abused: Not on file    Sexually Abused: Not on file   Housing Stability:     Unable to Pay for Housing in the Last Year: Not on file    Number of [place] in the Last Year: Not on file    Unstable Housing in the Last Year: Not on file       Family History:    Family History   Problem Relation Age of Onset    Heart Disease Father     Kidney Disease Brother        REVIEW OF SYSTEMS:  Constitutional: negative  Eyes: negative  Respiratory: negative  Cardiovascular: negative  Gastrointestinal: negative  Genitourinary: no acute issues  Musculoskeletal: negative  Skin: negative   Neurological: negative  Hematological/Lymphatic: negative  Psychological: negative    Physical Exam:      No data found. Constitutional: Patient in no acute distress; Neuro: alert and oriented to person place and time. Psych: Mood and affect normal.  Skin: Normal  Lungs: Respiratory effort normal, CTA  Cardiovascular:  Normal peripheral pulses; no murmur. Normal rhythm  Abdomen: Soft, non-tender, non-distended with no CVA, flank pain, hepatosplenomegaly or hernia. Kidneys normal.  Bladder non-tender and not distended. LABS:   No results for input(s): WBC, HGB, HCT, MCV, PLT in the last 72 hours. No results for input(s): NA, K, CL, CO2, PHOS, BUN, CREATININE, CA in the last 72 hours.   No results found for: PSA      Urinalysis: No results for input(s): COLORU, PHUR, LABCAST, WBCUA, RBCUA, MUCUS, TRICHOMONAS, YEAST, BACTERIA, CLARITYU, SPECGRAV, VIANCA, UROBILINOGEN, Booker Bird in the last 72 hours.     Invalid input(s): NITRATE, GLUCOSEUKETONESUAMORPHOUS     -----------------------------------------------------------------      Assessment and Plan     Impression:    Patient Active Problem List   Diagnosis    Kidney stone       Plan:     Consent obtained; left ureteroscopy w stent black beauty in OR today    Jaziel Blue MD

## 2022-02-25 NOTE — OP NOTE
[facility] [place]    DATE: 2/25/2022  Patient:  Nadege Estrada  MRN: [id number]  YOB: 1945    SURGEON: Raquel Huitron MD.    ASSISTANT: none    PREOPERATIVE DIAGNOSIS: left ureteral stone left kidney stones    POSTOPERATIVE DIAGNOSIS:  left ureteral stone left kidney stones    PROCEDURE PERFORMED: cystoscopy, left ureteroscopy left holmium laser lithotripsy      ANESTHESIA: General    COMPLICATIONS: none    OR BLOOD LOSS:  Minimal    FLUIDS: Cystalloids per Anesthesia    SPECIMENS:  * No specimens in log *  none    DRAINS: 6 x 26 double j stent    INDICATIONS FOR PROCEDURE:  The patient is a 78 y.o. female who presents today with Kidney stone here for CYSTOSCOPY LEFT URETEROSCOPY LASER LITHOTRIPSY BASKET RETRIEVAL  [address] After risks, benefits and alternatives of the procedure were discussed with the patient, the patient elected to proceed. DETAILS OF PROCEDURE:  After informed consent was obtained in the preoperative area, the patient was taken back to the operating room and transferred to the operating table in supine position. Anesthesia was induced and antibiotics were given. The patient was placed in modified dorsal lithotomy position and sterilely prepped and draped in a standard fashion. A timeout occurred. Two patient identifiers were used. We entered the urethra with a 22 [place] scope. We focused our attention on the left ureteral orifice. The ureteral orifice was visualized, and using a dual lumen catheter two wires were advanced into the kidney under fluoroscopic guidance. .    The flexible ureteroscope was assembled, place over one Glidewire, and advanced into the ureter carefully under fluoroscopy. The second wire remained in place as a safety. we were able to advance our scope up to the stone without difficulty.        The stones were located in the proximal ureter and kidney. We used a 200 micron laser to fragment all stones into sub 1-2 mm fragments for easy passage and clearance            Repeat nephroscopy and ureteroscopy demonstrated no further stone fragments. In addition, there were no papillary lesions within the kidney, or erythematous patches concerning for malignant disease. With the safety wire in place, the ureteroscope was slowly retracted down the ureter. The entire ureter was surveyed. There was no evidence of stricture, stone disease, ureteral trauma, or papillary lesion. To place the stent a 22 Slovak rigid cystoscopy was back loaded over the wire. Under direct visualization the stent was advanced until it was in proper location. The Glidewire was then removed. A curl could be seen in the Left renal pelvis under using fluoroscopic vision, and in the bladder under direct visualization. A string was left on the stent. The patients bladder was drained. All instrumentation was removed. The patient was then awakened and discharged back to the PACU in good and stable condition.

## 2022-02-25 NOTE — PROGRESS NOTES
Pt admitted to [facility] room 7 and oriented to unit. SCD sleeves applied. Nares swabbed. Pt verbalized permission for first name, last initial and physicians name on white board. SDS board and discharge criteria explained, pt and family verbalized understanding. Pt denies thoughts of harming self or others. Call light in reach. Family at the bedside.

## 2022-02-25 NOTE — ANESTHESIA PRE PROCEDURE
Department of Anesthesiology  Preprocedure Note       Name:  Ernestine Feliz   Age:  78 y.o.  :  1945                                          MRN:  [id number]         Date:  2022      Surgeon: Sanya Melara):  Deepthi Nickerson MD    Procedure: Procedure(s):  CYSTOSCOPY LEFT URETEROSCOPY LASER LITHOTRIPSY BASKET RETRIEVAL OF STONE FRAGMENTS POSSIBLE LEFT URETERAL STENT PLACEMENT    Medications prior to admission:   Prior to Admission medications    Medication Sig Start Date End Date Taking? Authorizing Provider   ketorolac (TORADOL) 10 MG tablet Take 10 mg by mouth every 6 hours as needed for Pain   Yes Historical Provider, MD   promethazine (PHENERGAN) 25 MG tablet Take 25 mg by mouth every 6 hours as needed for Nausea   Yes Historical Provider, MD   OIL OF OREGANO PO Take by mouth daily   Yes Historical Provider, MD   zinc 50 MG CAPS Take by mouth daily   Yes Historical Provider, MD   Cholecalciferol (VITAMIN D3 PO) Take 5,000 Units by mouth daily   Yes Historical Provider, MD   tamsulosin (FLOMAX) 0.4 MG capsule Take 1 capsule by mouth daily 22  Yes Obi Beach MD   GRAPE SEED EXTRACT PO Take 150 mg by mouth every morning   Yes Historical Provider, MD   Probiotic Product (PROBIOTIC DAILY PO) Take by mouth every morning    Yes Historical Provider, MD   OLIVE LEAF PO Take by mouth every morning    Yes Historical Provider, MD   MILK THISTLE PO Take by mouth every morning    Yes Historical Provider, MD   Omega-3 Fatty Acids (FISH OIL) 1200 MG CAPS Take by mouth every morning    Yes Historical Provider, MD   lisinopril (PRINIVIL;ZESTRIL) 20 MG tablet Take 20 mg by mouth daily. Yes Historical Provider, MD   levothyroxine (SYNTHROID) 75 MCG tablet Take 75 mcg by mouth Daily.    Yes Historical Provider, MD   timolol (BETIMOL) 0.5 % ophthalmic solution Place 1 drop into both eyes 2 times daily    Yes Historical Provider, MD       Current medications:    Current Facility-Administered Medications   Medication Dose Route Frequency Provider Last Rate Last Admin    0.9 % sodium chloride infusion   IntraVENous Continuous Desirae Kelly  mL/hr at 02/25/22 0720 New Bag at 02/25/22 0720    ceFAZolin (ANCEF) 2000 mg in dextrose 4 % 100 mL IVPB (premix)  2,000 mg IntraVENous [address], MD           Allergies: Allergies   Allergen Reactions    Latex Rash       Problem List:    Patient Active Problem List   Diagnosis Code    Kidney stone N20.0       Past Medical History:        Diagnosis Date    Cancer ([address]     breast ca    Fibroids     History of kidney stones     Hypertension     Kidney stones     Shingles 03/2021    Shingles     Thyroid disorder        Past Surgical History:        Procedure Laterality Date    BREAST LUMPECTOMY Left 2004? Dr. Nitish Grant Right 2007    COLONOSCOPY  2017    Dr. Kasandra Nelson Left     nerve reattachment -Dr. Denise Ruiz, [address]    LITHOTRIPSY      x3 with Dr. Annie Chowdary LITHOTRIPSY Right 8/3/2017    RIGHT ESWL [address] LITHOTRIPSY performed by Nomi Parra MD at [address] [employer] LITHOTRIPSY Left 9/1/2017    LEFT ESWL [address] LITHOTRIPSY performed by Nomi Parra MD at [facility] EXTRACTION         Social History:    Social History     Tobacco Use    Smoking status: Never Smoker    Smokeless tobacco: Never Used   Substance Use Topics    Alcohol use:  No                                Counseling given: Not Answered      Vital Signs (Current):   Vitals:    02/21/22 1526 02/25/22 0647   BP:  (!) 183/84   Pulse:  65   Resp:  17   Temp:  98.4 °F (36.9 °C)   TempSrc:  Tympanic   SpO2:  96%   Weight: 163 lb (73.9 kg) 159 lb 6.4 oz (72.3 kg)   Height: 4' 9\" (1.448 m)                                               BP Readings from Last 3 Encounters:   02/25/22 (!) 183/84   02/15/22 (!) 140/78   02/13/22 (!) 101/59       NPO Status: Time of last liquid consumption: 2100                        Time of last solid consumption: 1900                        Date of last liquid consumption: 02/24/22                        Date of last solid food consumption: 02/24/22    BMI:   Wt Readings from Last 3 Encounters:   02/25/22 159 lb 6.4 oz (72.3 kg)   02/15/22 163 lb (73.9 kg)   02/13/22 163 lb (73.9 kg)     Body mass index is 34.49 kg/m². CBC:   Lab Results   Component Value Date    WBC 11.9 02/13/2022    RBC 5.22 02/13/2022    HGB 15.4 02/13/2022    HCT 47.4 02/13/2022    MCV 90.8 02/13/2022    RDW 14.1 07/28/2017     02/13/2022       CMP:   Lab Results   Component Value Date     02/13/2022    K 4.5 02/13/2022     02/13/2022    CO2 21 02/13/2022    BUN 21 02/13/2022    CREATININE 0.8 02/13/2022    LABGLOM 70 02/13/2022    GLUCOSE 175 02/13/2022    PROT 8.0 02/13/2022    CALCIUM 10.4 02/13/2022    BILITOT 0.5 02/13/2022    ALKPHOS 111 02/13/2022    AST 17 02/13/2022    ALT 25 02/13/2022       POC Tests: No results for input(s): POCGLU, POCNA, POCK, POCCL, POCBUN, POCHEMO, POCHCT in the last 72 hours. Coags:   Lab Results   Component Value Date    INR 0.91 09/01/2017       HCG (If Applicable): No results found for: PREGTESTUR, PREGSERUM, HCG, HCGQUANT     ABGs: No results found for: PHART, PO2ART, KEB2LSS, DAU3HSW, BEART, G5YKRNED     Type & Screen (If Applicable):  No results found for: LABABO, LABRH    Drug/Infectious Status (If Applicable):  No results found for: HIV, HEPCAB    COVID-19 Screening (If Applicable): No results found for: COVID19        Anesthesia Evaluation    Airway: Mallampati: II        Dental:          Pulmonary:       (-) COPD                           Cardiovascular:    (+) hypertension:,                   Neuro/Psych:      (-) CVA           GI/Hepatic/Renal:             Endo/Other:                     Abdominal:             Vascular:           Other Findings:             Anesthesia Plan      general     ASA 3       Induction: intravenous. Anesthetic plan and risks discussed with patient. Plan discussed with CRNA.                   Willma Sandhoff, MD   2/25/2022

## 2022-02-28 ENCOUNTER — TELEPHONE (OUTPATIENT)
Dept: UROLOGY | Age: 77
End: 2022-02-28

## 2022-02-28 DIAGNOSIS — N20.0 KIDNEY STONE: Primary | ICD-10-CM

## 2022-02-28 NOTE — TELEPHONE ENCOUNTER
Left works Friday by Dr Rebekah Hines  She called Friday, please reach out and see if her pain improved

## 2022-02-28 NOTE — TELEPHONE ENCOUNTER
Patient called in regarding this and to schedule to have her stents removed. She said she had a horrible weekend and even called Dr Yamilka Sow on call. She said she is better now, her blood in her urine and pain have both subsided.  Please call her to advise about stent removal.

## 2022-03-04 ENCOUNTER — NURSE ONLY (OUTPATIENT)
Dept: UROLOGY | Age: 77
End: 2022-03-04
Payer: MEDICARE

## 2022-03-04 DIAGNOSIS — N20.0 KIDNEY STONE: Primary | ICD-10-CM

## 2022-03-04 PROCEDURE — 99211 OFF/OP EST MAY X REQ PHY/QHP: CPT | Performed by: UROLOGY

## 2022-03-04 NOTE — PROGRESS NOTES
Patient has given me verbal consent to perform string stent removal  Yes      Patient presents today for string stent removal.    DATE OF PROCEDURE: 02/25/2022       PREOPERATIVE DIAGNOSIS:  left ureteral stone left kidney stones        POSTOPERATIVE DIAGNOSIS: left ureteral stone left kidney stones        PROCEDURES PERFORMED: cystoscopy, left ureteroscopy left holmium laser lithotripsy      The patient has no complaints or signs/symptoms of an infection. Per Dr. Desirae Wolf 's orders, string stent was removed without difficulty. The patient was instructed to drink plenty of water and told that it is normal to have some discomfort for next 24 hours after stent removal. This should gradually resolve over 1 to 3 days. Please notify the office with symptoms of a urinary infection.

## 2022-04-19 ENCOUNTER — HOSPITAL ENCOUNTER (OUTPATIENT)
Dept: ULTRASOUND IMAGING | Age: 77
Discharge: HOME | End: 2022-04-19
Payer: MEDICARE

## 2022-04-19 DIAGNOSIS — N20.0 KIDNEY STONE: ICD-10-CM

## 2022-04-19 PROCEDURE — 76775 US EXAM ABDO BACK WALL LIM: CPT

## 2022-04-26 ENCOUNTER — OFFICE VISIT (OUTPATIENT)
Dept: UROLOGY | Age: 77
End: 2022-04-26
Payer: MEDICARE

## 2022-04-26 VITALS
HEIGHT: 57 IN | SYSTOLIC BLOOD PRESSURE: 144 MMHG | DIASTOLIC BLOOD PRESSURE: 78 MMHG | BODY MASS INDEX: 33.87 KG/M2 | WEIGHT: 157 LBS

## 2022-04-26 DIAGNOSIS — N20.0 KIDNEY STONE: Primary | ICD-10-CM

## 2022-04-26 LAB
BILIRUBIN URINE: NEGATIVE
BLOOD URINE, POC: ABNORMAL
CHARACTER, URINE: CLEAR
COLOR, URINE: YELLOW
GLUCOSE URINE: NEGATIVE MG/DL
KETONES, URINE: ABNORMAL
LEUKOCYTE CLUMPS, URINE: NEGATIVE
NITRITE, URINE: NEGATIVE
PH, URINE: 7 (ref 5–9)
PROTEIN, URINE: NEGATIVE MG/DL
SPECIFIC GRAVITY, URINE: 1.02 (ref 1–1.03)
UROBILINOGEN, URINE: 0.2 EU/DL (ref 0–1)

## 2022-04-26 PROCEDURE — 81003 URINALYSIS AUTO W/O SCOPE: CPT | Performed by: NURSE PRACTITIONER

## 2022-04-26 PROCEDURE — 99214 OFFICE O/P EST MOD 30 MIN: CPT | Performed by: NURSE PRACTITIONER

## 2022-04-26 ASSESSMENT — ENCOUNTER SYMPTOMS
ABDOMINAL PAIN: 0
BACK PAIN: 0
VOMITING: 0
NAUSEA: 0

## 2022-04-26 NOTE — PROGRESS NOTES
[address] [address] [place] [id number]  Dept: [phone]  Loc: [phone]    Visit Date: 4/26/2022        HPI:     Dylan Brenner is a 78 y.o. female who presents today for:  Chief Complaint   Patient presents with    Post-Op Check     cystoscopy, left ureteroscopy left holmium laser lithotripsy      Follow-up     us renal and [facility]        HPI   Pt seen in follow up for kidney stone. Shiraz Higuera presented in February with left proximal ureteral calculus 6 mm in size and bilateral punctate nonobstructive renal calculi. She underwent cystoscopy, left ureteroscopy, L HLL, and left ureteral stent placement (string) on 2/25/22 by Dr. Enrique Sinha. String stent removed in the office 3/4/22. Pt here today in follow up with [facility] and renal us prior to appt. Denies any further symptoms of kidney stones. Current Outpatient Medications   Medication Sig Dispense Refill    OIL OF OREGANO PO Take by mouth daily      zinc 50 MG CAPS Take by mouth daily      Cholecalciferol (VITAMIN D3 PO) Take 5,000 Units by mouth daily      GRAPE SEED EXTRACT PO Take 150 mg by mouth every morning      Probiotic Product (PROBIOTIC DAILY PO) Take by mouth every morning       OLIVE LEAF PO Take by mouth every morning       MILK THISTLE PO Take by mouth every morning       Omega-3 Fatty Acids (FISH OIL) 1200 MG CAPS Take by mouth every morning       lisinopril (PRINIVIL;ZESTRIL) 20 MG tablet Take 20 mg by mouth daily.  levothyroxine (SYNTHROID) 75 MCG tablet Take 75 mcg by mouth Daily.  timolol (BETIMOL) 0.5 % ophthalmic solution Place 1 drop into both eyes 2 times daily        No current facility-administered medications for this visit. Past Medical History  Yelena Nice  has a past medical history of Cancer ([facility] [address] Fibroids, History of kidney stones, Hypertension, Kidney stones, Shingles, Shingles, and Thyroid disorder.     Past Surgical History  The patient  has a past surgical history that includes Hysterectomy, total abdominal (1987); Breast lumpectomy (Left, 2004?); Carpal tunnel release (Right, 2007); Lithotripsy; [place] tooth extraction; Colonoscopy (2017); Hand surgery (Left); Lithotripsy (Right, 8/3/2017); Lithotripsy (Left, 9/1/2017); and Ureter surgery (Left, 2/25/2022). Family History  This patient's family history includes Heart Disease in her father; Kidney Disease in her brother. Social History  Cesar Werner  reports that she has never smoked. She has never used smokeless tobacco. She reports that she does not drink alcohol and does not use drugs. Subjective:      Review of Systems   Constitutional: Negative for activity change, appetite change, chills, diaphoresis, fatigue, fever and unexpected weight change. Gastrointestinal: Negative for abdominal pain, nausea and vomiting. Genitourinary: Negative for decreased urine volume, difficulty urinating, dysuria, flank pain, frequency, hematuria and urgency. Musculoskeletal: Negative for back pain. Objective:   BP (!) 144/78   Ht 4' 9\" (1.448 m)   Wt 157 lb (71.2 kg)   BMI 33.97 kg/m²     Physical Exam  Vitals reviewed. Constitutional:       General: She is not in acute distress. Appearance: Normal appearance. She is well-developed. She is not ill-appearing or diaphoretic. HENT:      Head: Normocephalic and atraumatic. Right Ear: External ear normal.      Left Ear: External ear normal.      Nose: Nose normal.      Mouth/Throat:      Mouth: Mucous membranes are moist.   Eyes:      General: No scleral icterus. Right eye: No discharge. Left eye: No discharge. Neck:      Vascular: No JVD. Trachea: No tracheal deviation. Pulmonary:      Effort: Pulmonary effort is normal. No respiratory distress. Musculoskeletal:         General: No tenderness. Normal range of motion.    Neurological:      Mental Status: She is alert and oriented to person, place, and time. Mental status is at baseline. Psychiatric:         Mood and Affect: Mood normal.         Behavior: Behavior normal.         Thought Content: Thought content normal.         POC  Results for POC orders placed in visit on 04/26/22   POCT Urinalysis No Micro (Auto)   Result Value Ref Range    Glucose, Ur Negative NEGATIVE mg/dl    Bilirubin Urine Negative     Ketones, Urine Trace (A) NEGATIVE    Specific Gravity, Urine 1.025 1.002 - 1.030    Blood, UA POC Trace-intact NEGATIVE    pH, Urine 7.00 5.0 - 9.0    Protein, Urine Negative NEGATIVE mg/dl    Urobilinogen, Urine 0.20 0.0 - 1.0 eu/dl    Nitrite, Urine Negative NEGATIVE    Leukocyte Clumps, Urine Negative NEGATIVE    Color, Urine Yellow YELLOW-STRAW    Character, Urine Clear CLR-SL.CLOUD     Patients recent PSA values are as follows  No results found for: PSA, PSADIA     Recent BUN/Creatinine:  Lab Results   Component Value Date    BUN 21 02/13/2022    CREATININE 0.8 02/13/2022     Radiology  The patient has had a Renal Ultrasound 4/19/22 which I have independently reviewed along with its accompanying report. The study demonstrates nonobstructive left nephrolithiasis. Assessment:   Kidney stones  Hypercalciuria    Plan:     [facility] with hypercalciuria. Discussed maintaining a moderate calcium intake of 800-1200 mg of calcium per day from diet intake and not from supplements. Discussed watching sodium intake and keeping animal meat less than 9 ozs per day. Pt's serum calcium level 10.4 2/13/22. On vitamin D supplementation. Recommend checking ical, vit d, pth levels at this time. Call results. KUB in 6-12 months with appt to review results.

## 2022-04-27 ENCOUNTER — TELEPHONE (OUTPATIENT)
Dept: UROLOGY | Age: 77
End: 2022-04-27

## 2022-04-27 ENCOUNTER — HOSPITAL ENCOUNTER (OUTPATIENT)
Age: 77
Discharge: HOME | End: 2022-04-27
Payer: MEDICARE

## 2022-04-27 DIAGNOSIS — N20.0 KIDNEY STONE: ICD-10-CM

## 2022-04-27 LAB
PTH INTACT: 46 PG/ML (ref 15–65)
VITAMIN D 25-HYDROXY: 43 NG/ML (ref 30–100)

## 2022-04-27 PROCEDURE — 83970 ASSAY OF PARATHORMONE: CPT

## 2022-04-27 PROCEDURE — 36415 COLL VENOUS BLD VENIPUNCTURE: CPT

## 2022-04-27 PROCEDURE — 82306 VITAMIN D 25 HYDROXY: CPT

## 2022-04-27 NOTE — TELEPHONE ENCOUNTER
Lab called stating they needed a different dx code for the Vitamin D for medicare to cover. They will use hypercalciuria.

## 2022-04-28 ENCOUNTER — TELEPHONE (OUTPATIENT)
Dept: UROLOGY | Age: 77
End: 2022-04-28

## 2022-04-28 ENCOUNTER — HOSPITAL ENCOUNTER (OUTPATIENT)
Age: 77
Discharge: HOME | End: 2022-04-28
Payer: MEDICARE

## 2022-04-28 LAB — IONIZED CALCIUM, WHOLE BLOOD: 1.31 MMOL/L (ref 1.12–1.32)

## 2022-04-28 PROCEDURE — 82330 ASSAY OF CALCIUM: CPT

## 2022-04-28 NOTE — TELEPHONE ENCOUNTER
Pt advised that her Vitamin D and PTH levels are within normal limits. Pt said someone called yesterday and told her to get her Vitamin D level drawn again, so she is going today to get that redrawn.

## 2022-04-28 NOTE — TELEPHONE ENCOUNTER
----- Message from [facility] SHIRA IGLESIAS - CNP sent at 4/27/2022  5:46 PM EDT -----  Please let pt know vitamin d and pth levels within normal limits

## 2022-05-03 ENCOUNTER — TELEPHONE (OUTPATIENT)
Dept: UROLOGY | Age: 77
End: 2022-05-03

## 2022-05-04 ENCOUNTER — TELEPHONE (OUTPATIENT)
Dept: UROLOGY | Age: 77
End: 2022-05-04

## 2022-06-07 NOTE — ED TRIAGE NOTES
Pt presents to the ED with c/o LT flank pain and nausea. Pt states symptoms started yesterday afternoon. Pt states she had nausea and vomiting, but now mainly dry heaving. Pt reports having 10/10 dull LT back into flank pain, with intermittent sharp pains. Pt also reports feeling dizzy. Pt denies dysuria. Pt states she had 2 bowel movements this morning. Pt states this feels like previous kidney stone. Pt states she has had 2 lithotripsy and 1 surgery. Pt states her urologist was here at [facility]. Unique Flap 3 Name: Spear Flap

## 2022-06-09 ENCOUNTER — HOSPITAL ENCOUNTER (OUTPATIENT)
Age: 77
Discharge: HOME | End: 2022-06-09
Payer: MEDICARE

## 2022-06-09 LAB
ANION GAP SERPL CALCULATED.3IONS-SCNC: 13 MEQ/L (ref 8–16)
BUN BLDV-MCNC: 21 MG/DL (ref 7–22)
CALCIUM SERPL-MCNC: 10.1 MG/DL (ref 8.5–10.5)
CHLORIDE BLD-SCNC: 104 MEQ/L (ref 98–111)
CHOLESTEROL, TOTAL: 275 MG/DL (ref 100–199)
CO2: 24 MEQ/L (ref 23–33)
CREAT SERPL-MCNC: 0.6 MG/DL (ref 0.4–1.2)
ERYTHROCYTE [DISTWIDTH] IN BLOOD BY AUTOMATED COUNT: 14 % (ref 11.5–14.5)
ERYTHROCYTE [DISTWIDTH] IN BLOOD BY AUTOMATED COUNT: 47 FL (ref 35–45)
GFR SERPL CREATININE-BSD FRML MDRD: > 90 ML/MIN/1.73M2
GLUCOSE BLD-MCNC: 87 MG/DL (ref 70–108)
HCT VFR BLD CALC: 46.6 % (ref 37–47)
HDLC SERPL-MCNC: 39 MG/DL
HEMOGLOBIN: 14.8 GM/DL (ref 12–16)
LDL CHOLESTEROL CALCULATED: 199 MG/DL
MCH RBC QN AUTO: 28.8 PG (ref 26–33)
MCHC RBC AUTO-ENTMCNC: 31.8 GM/DL (ref 32.2–35.5)
MCV RBC AUTO: 90.8 FL (ref 81–99)
PLATELET # BLD: 212 THOU/MM3 (ref 130–400)
PMV BLD AUTO: 12.2 FL (ref 9.4–12.4)
POTASSIUM SERPL-SCNC: 4.8 MEQ/L (ref 3.5–5.2)
RBC # BLD: 5.13 MILL/MM3 (ref 4.2–5.4)
SODIUM BLD-SCNC: 141 MEQ/L (ref 135–145)
TRIGL SERPL-MCNC: 185 MG/DL (ref 0–199)
TSH SERPL DL<=0.05 MIU/L-ACNC: 0.98 UIU/ML (ref 0.4–4.2)
VITAMIN D 25-HYDROXY: 42 NG/ML (ref 30–100)
WBC # BLD: 6.5 THOU/MM3 (ref 4.8–10.8)

## 2022-06-09 PROCEDURE — 85027 COMPLETE CBC AUTOMATED: CPT

## 2022-06-09 PROCEDURE — 80061 LIPID PANEL: CPT

## 2022-06-09 PROCEDURE — 84443 ASSAY THYROID STIM HORMONE: CPT

## 2022-06-09 PROCEDURE — 82306 VITAMIN D 25 HYDROXY: CPT

## 2022-06-09 PROCEDURE — 80048 BASIC METABOLIC PNL TOTAL CA: CPT

## 2022-06-09 PROCEDURE — 36415 COLL VENOUS BLD VENIPUNCTURE: CPT

## 2022-07-12 ENCOUNTER — HOSPITAL ENCOUNTER (OUTPATIENT)
Dept: INTERVENTIONAL RADIOLOGY/VASCULAR | Age: 77
Discharge: HOME | End: 2022-07-12
Payer: MEDICARE

## 2022-07-12 DIAGNOSIS — I70.8 ATHEROSCLEROSIS OF OTHER ARTERIES: ICD-10-CM

## 2022-07-12 DIAGNOSIS — R09.89 OTH SYMPTOMS AND SIGNS INVOLVING THE CIRC AND RESP SYSTEMS: ICD-10-CM

## 2022-07-12 PROCEDURE — 93880 EXTRACRANIAL BILAT STUDY: CPT

## 2022-10-13 RX ORDER — LOSARTAN POTASSIUM 100 MG/1
100 TABLET ORAL DAILY
COMMUNITY

## 2022-10-13 NOTE — PROGRESS NOTES
Patient instructed not to eat or drink anything after midnight the day before surgery. Take heart & blood pressure medications in the morning with a small sip of water. Please bring list of medications with the dosages & when you take them. If you do not  have a list bring the medications bottles with you. If having a MAC or general anesthetic you MUST have a . Bring photo ID & insurance information. Leave jewelry (watch ,rings, peircings) & other valuables, including extra cash, at home. Wear comfortable clean clothing. When showering or bathing the night before & morning of surgery please use  antibacterial soap. Someone to stay with you post-op. Follow instructions from Dr. Alivia Valenciao office & bring bag of eye drops along. Kamar Hays

## 2022-10-19 NOTE — DISCHARGE INSTRUCTIONS
Cataract Post-Operative Care Instructions     How to Instill you Eye Drops:    Wash your hands before instilling drops   Shake eye drop bottle putting one drop in the surgical eye   The dropper tip should not touch the eyelashes or the eye. Your head should be tilted back, look up and lower the eyelid pulled down from the cheekbone to form a pocket for the eye drop. Daily Eye Drop Schedule:    Remove the eye patch as directed by the recovery room nurse. Apply 1 drop of Ofloxacin three times today, then four times a day starting tomorrow for one week. Lay a warm, clean and moist washcloth for 5 minutes on operative eye. Apply 1 drop of Lotemax two times today, and then starting tomorrow three times a day for one week, two times a day for one week and then once a day for one week. Lay a warm, clean and moist washcloth for 5 minutes on operative eye. Apply 1 drop of Prolensa one time a day starting tomorrow for two weeks. Lay a warm, clean and moist washcloth for 5 minutes on operative eye. Care at Home:     Wear a shield at bedtime for one week following your eye surgery. NEVER RUB YOUR OPERATIVE EYE! Use of the operative eye is NOT harmful. Your vision may be blurry with your present glasses. Take your glasses to your follow-up appointment the day after surgery. You will receive your new glasses prescription approximately 4-5 weeks following surgery. You may do everything to care for yourself. You may sleep on your back or either side after surgery, but NOT FACE DOWN ON YOUR STOMACH. NO LIFTING OVER 10 POUNDS. No outdoor work until your doctor tells you that it is OK. Light work, including stooping over is not harmful. If you have glaucoma, continue your normal use of the glaucoma drops. Do not submerge head under water (Hot tub/swimming pool)    Please call office if any problem arise at [phone] [address].     I have had the opportunity to ask questions and have received understandable answers to my questions. I understand these instructions and have received a copy for my use.

## 2022-10-20 ENCOUNTER — HOSPITAL ENCOUNTER (OUTPATIENT)
Age: 77
Setting detail: OUTPATIENT SURGERY
Discharge: HOME | End: 2022-10-20
Attending: OPHTHALMOLOGY | Admitting: OPHTHALMOLOGY
Payer: MEDICARE

## 2022-10-20 ENCOUNTER — ANESTHESIA EVENT (OUTPATIENT)
Dept: OPERATING ROOM | Age: 77
End: 2022-10-20
Payer: MEDICARE

## 2022-10-20 ENCOUNTER — ANESTHESIA (OUTPATIENT)
Dept: OPERATING ROOM | Age: 77
End: 2022-10-20
Payer: MEDICARE

## 2022-10-20 VITALS
WEIGHT: 151 LBS | RESPIRATION RATE: 18 BRPM | TEMPERATURE: 97.6 F | SYSTOLIC BLOOD PRESSURE: 126 MMHG | HEART RATE: 52 BPM | DIASTOLIC BLOOD PRESSURE: 60 MMHG | OXYGEN SATURATION: 95 % | BODY MASS INDEX: 32.58 KG/M2 | HEIGHT: 57 IN

## 2022-10-20 PROCEDURE — 2720000010 HC SURG SUPPLY STERILE: Performed by: OPHTHALMOLOGY

## 2022-10-20 PROCEDURE — 2500000003 HC RX 250 WO HCPCS: Performed by: OPHTHALMOLOGY

## 2022-10-20 PROCEDURE — 3700000001 HC ADD 15 MINUTES (ANESTHESIA): Performed by: OPHTHALMOLOGY

## 2022-10-20 PROCEDURE — C1783 OCULAR IMP, AQUEOUS DRAIN DE: HCPCS | Performed by: OPHTHALMOLOGY

## 2022-10-20 PROCEDURE — 7100000010 HC PHASE II RECOVERY - FIRST 15 MIN: Performed by: OPHTHALMOLOGY

## 2022-10-20 PROCEDURE — 6370000000 HC RX 637 (ALT 250 FOR IP): Performed by: OPHTHALMOLOGY

## 2022-10-20 PROCEDURE — 3700000000 HC ANESTHESIA ATTENDED CARE: Performed by: OPHTHALMOLOGY

## 2022-10-20 PROCEDURE — V2632 POST CHMBR INTRAOCULAR LENS: HCPCS | Performed by: OPHTHALMOLOGY

## 2022-10-20 PROCEDURE — 2709999900 HC NON-CHARGEABLE SUPPLY: Performed by: OPHTHALMOLOGY

## 2022-10-20 PROCEDURE — 3600000012 HC SURGERY LEVEL 2 ADDTL 15MIN: Performed by: OPHTHALMOLOGY

## 2022-10-20 PROCEDURE — 3600000002 HC SURGERY LEVEL 2 BASE: Performed by: OPHTHALMOLOGY

## 2022-10-20 PROCEDURE — 6360000002 HC RX W HCPCS: Performed by: OPHTHALMOLOGY

## 2022-10-20 PROCEDURE — 7100000011 HC PHASE II RECOVERY - ADDTL 15 MIN: Performed by: OPHTHALMOLOGY

## 2022-10-20 PROCEDURE — 2580000003 HC RX 258: Performed by: OPHTHALMOLOGY

## 2022-10-20 PROCEDURE — 6360000002 HC RX W HCPCS: Performed by: NURSE ANESTHETIST, CERTIFIED REGISTERED

## 2022-10-20 DEVICE — ACRYSOF(R) IQ ASPHERIC NATURAL IOL, SINGLE-PIECE ACRYLIC FOLDABLE PCL, UV WITH BLUE LIGHTFILTER, 13.0MM LENGTH, 6.0MM ANTERIORASYMMETRIC BICONVEX OPTIC, PLANAR HAPTICS.
Type: IMPLANTABLE DEVICE | Site: EYE | Status: FUNCTIONAL
Brand: ACRYSOF®

## 2022-10-20 DEVICE — SYSTEM MIC BYPS AD H360UM DIA230UM STEARALKONIUM HEP TI: Type: IMPLANTABLE DEVICE | Site: EYE | Status: FUNCTIONAL

## 2022-10-20 RX ORDER — MIDAZOLAM HYDROCHLORIDE 1 MG/ML
INJECTION INTRAMUSCULAR; INTRAVENOUS PRN
Status: DISCONTINUED | OUTPATIENT
Start: 2022-10-20 | End: 2022-10-20 | Stop reason: SDUPTHER

## 2022-10-20 RX ORDER — LIDOCAINE HYDROCHLORIDE 10 MG/ML
INJECTION, SOLUTION EPIDURAL; INFILTRATION; INTRACAUDAL; PERINEURAL PRN
Status: DISCONTINUED | OUTPATIENT
Start: 2022-10-20 | End: 2022-10-20 | Stop reason: ALTCHOICE

## 2022-10-20 RX ORDER — BALANCED SALT SOLUTION ENRICHED WITH BICARBONATE, DEXTROSE, AND GLUTATHIONE
KIT INTRAOCULAR PRN
Status: DISCONTINUED | OUTPATIENT
Start: 2022-10-20 | End: 2022-10-20 | Stop reason: ALTCHOICE

## 2022-10-20 RX ORDER — FENTANYL CITRATE 50 UG/ML
INJECTION, SOLUTION INTRAMUSCULAR; INTRAVENOUS PRN
Status: DISCONTINUED | OUTPATIENT
Start: 2022-10-20 | End: 2022-10-20 | Stop reason: SDUPTHER

## 2022-10-20 RX ORDER — SODIUM CHLORIDE 9 MG/ML
INJECTION, SOLUTION INTRAVENOUS CONTINUOUS
Status: DISCONTINUED | OUTPATIENT
Start: 2022-10-20 | End: 2022-10-20 | Stop reason: HOSPADM

## 2022-10-20 RX ORDER — BUPIVACAINE HYDROCHLORIDE 7.5 MG/ML
1 INJECTION, SOLUTION EPIDURAL; RETROBULBAR EVERY 5 MIN PRN
Status: COMPLETED | OUTPATIENT
Start: 2022-10-20 | End: 2022-10-20

## 2022-10-20 RX ADMIN — BUPIVACAINE HYDROCHLORIDE 0.38 MG: 7.5 INJECTION, SOLUTION EPIDURAL; RETROBULBAR at 07:45

## 2022-10-20 RX ADMIN — BUPIVACAINE HYDROCHLORIDE 0.38 MG: 7.5 INJECTION, SOLUTION EPIDURAL; RETROBULBAR at 07:50

## 2022-10-20 RX ADMIN — FENTANYL CITRATE 25 MCG: 50 INJECTION, SOLUTION INTRAMUSCULAR; INTRAVENOUS at 08:39

## 2022-10-20 RX ADMIN — BUPIVACAINE HYDROCHLORIDE 0.38 MG: 7.5 INJECTION, SOLUTION EPIDURAL; RETROBULBAR at 07:40

## 2022-10-20 RX ADMIN — Medication 1 DROP: at 07:45

## 2022-10-20 RX ADMIN — Medication 1 DROP: at 07:40

## 2022-10-20 RX ADMIN — Medication 1 DROP: at 07:50

## 2022-10-20 RX ADMIN — MIDAZOLAM 1 MG: 1 INJECTION INTRAMUSCULAR; INTRAVENOUS at 08:39

## 2022-10-20 RX ADMIN — SODIUM CHLORIDE: 9 INJECTION, SOLUTION INTRAVENOUS at 08:37

## 2022-10-20 ASSESSMENT — PAIN SCALES - GENERAL
PAINLEVEL_OUTOF10: 0

## 2022-10-20 NOTE — H&P
I have examined the patient and reviewed the H&P / Consult and there are no changes to the patient.     Jesse Nettles DO 10/20/30737:37 AM

## 2022-10-20 NOTE — PROGRESS NOTES
8873- Pt to PACU phase 2, Nicole Ferraro RN at bedside for report. Pt had setnt paced was irritaiting eye back in OR instructed just to close eye per OR nurse. 5831- Unable to get BP will re check in a bit  0918- Snack given. 7594- Pt having snack, had to change BP cuff, able to get BP finally see flow sheet. 7814- Pt and friend given discharge instructions verbalized understanding, eye drop schedule and given implant card.   9908- IV removed, pt dressing  1000-PT discharged walked out to car with this RN

## 2022-10-20 NOTE — OP NOTE
[facility]  RECORD OF OPERATION                       10/20/2022    Patient: Mamta Figueroa  : 1945  Acct#: [de-identified]    PRE-OPERATIVE DIAGNOSIS:  Cataract, OS. Primary Open Angle Glaucoma (Mild Stage), OS    POST-OPERATIVE DIAGNOSIS:  same    OPERATION PERFORMED:  Traditional phacoemulsification cataract extraction with posterior chamber intraocular lens placement, OS. Trabecular Meshwork Bypass Stenting, OS. SURGEON:  Bolivar Camilo DO    ANESTHESIA:  Topical/MAC    COMPLICATIONS:  None    MODIFIER: Non-complex       LENS IMPLANT INFORMATION: +19.0 D (SN: [id number])    TRABECULAR MESHWORK STENT (IMPLANT) INFORMATION: iStent inject W (SN: [id number])    CDE: 2.11    INDICATION AND CONSENT:  The patient was found to have a visually significant cataract affecting their activities of daily living which is not adequately correctable by a change in spectacles. The risks, benefits, and alternative options of cataract surgery including observation were discussed. Consent was obtained and the patient requests to proceed with cataract surgery. In addition, the patient was found to have glaucoma with uncontrolled intraocular pressures and/or glaucoma with a desire to reduce medication burden. The risks, benefits, and alternative options of combined canaloplasty with trabecular meshwork stenting including medications and observation were discussed. The patient expressed a desire to proceed with cataract surgery, as well as, canaloplasty with trabecular meshwork stenting and consent was obtained. OPERATIVE TECHNIQUE: In the preoperative area, the patient was prepared for surgery. The patient was then transferred to the operation cart. The patient was then taken to the operating room, the operative eye was prepped and draped in the usual sterile ophthalmic fashion.  A final timeout was perform to confirm the patient, site, side, lens, and procedure with the consent and pre-operative paperwork. A lid speculum was inserted. A paracentesis incision was made at the limbus in a position comfortable for the non-dominate hand. 0.2-0.4cc of 1% lidocaine was instilled into the anterior chamber followed by 0.2-0.4cc of Omidria. Viscoelastic was instilled into the anterior chamber. A keratome was used to produce a temporal clear corneal incision at the temporal limbus. A capsulorrhexis-type capsulotomy was performed. Hydrodissection was performed balanced salt solution (BSS). The lens nucleus was phacoemulsified. The lens cortical material was aspirated using the automated irrigation/aspiration unit (I/A). Additional viscoelastic was instilled into the anterior segment and capsular bag. An intraocular lens was introduced and centered within the capsular bag. The patient positioning and microscope were adjusted for gonioscopy. Additional provisc was placed on the eye and a gonio prism was utilized to view the trabecular meshwork. Additional provisc was injected into the angle. A Omni system was utilized to engage the nasal trabecular meshwork directed inferiorly and a 180 degree viscodilation of the Schlem's canal was conducted. The Omni system was withdrawn from the eye. Additional provisc was injected into the nasal angle. The Omni system was then utilized to engage the nasal trabecular meshwork nasal directed superiorly and an additional 90 degrees of Schlem's canal was viscodilated. A single iStent inject W trabecular meshwork stent was placed in the nasal trabecular meshwork. The second iStent did not set well and was removed from the eye with Inumura forceps. The second iStent was confirmed to be on the back table and not in the eye. The patient positioning and microscope were return to primary position. The viscoelastic was removed and replaced with BSS using the I/A. The wounds were hydrated with BSS. The tension of the globe was adjusted with BSS.  The incisions were watertight and the lens in desired postion. The lid speculum was removed. Post-operative drops were placed on the eye. The patient tolerated the procedure well and was taken from the operating room in good condition.     Electronically signed by Kaitlynn Guerrero DO on 10/20/2022 at 1:09 PM

## 2022-10-20 NOTE — PROGRESS NOTES
Proparacaine 2 drops at start of case. Ofloxacin 1 drop at end of case. Lotemax 1 drop at end of case  Vigamox 3-1 mixture . 2ml at end of case

## 2022-10-20 NOTE — ANESTHESIA PRE PROCEDURE
Department of Anesthesiology  Preprocedure Note       Name:  Jass Engle   Age:  78 y.o.  :  1945                                          MRN:  [id number]         Date:  10/20/2022      Surgeon: Olga Huff):  Tamiko Martins DO    Procedure: Procedure(s):  LEFT EYE CATARACT EMULSIFICATION IOL IMPLANT, POSSIBLE GONIOTOMY, POSSIBLE CANALPLASTY    Medications prior to admission:   Prior to Admission medications    Medication Sig Start Date End Date Taking? Authorizing Provider   losartan (COZAAR) 100 MG tablet Take 100 mg by mouth daily   Yes Historical Provider, MD   Iodine Strong, Lugols, (IODINE STRONG PO) Take by mouth   Yes Historical Provider, MD   LUTEIN PO Take by mouth   Yes Historical Provider, MD   MAGNESIUM PO Take 250 mg by mouth   Yes Historical Provider, MD   B Complex Vitamins (VITAMIN B COMPLEX PO) Take by mouth daily   Yes Historical Provider, MD   Acetylcysteine (N-ACETYL-L-CYSTEINE PO) Take by mouth daily   Yes Historical Provider, MD   OIL OF OREGANO PO Take 150 mg by mouth daily    Historical Provider, MD   zinc 50 MG CAPS Take 25 mg by mouth daily    Historical Provider, MD   Cholecalciferol (VITAMIN D3 PO) Take 5,000 Units by mouth daily    Historical Provider, MD   GRAPE SEED EXTRACT PO Take 150 mg by mouth every morning    Historical Provider, MD   Probiotic Product (PROBIOTIC DAILY PO) Take by mouth every morning     Historical Provider, MD   OLIVE LEAF PO Take by mouth every morning     Historical Provider, MD   MILK THISTLE PO Take by mouth every morning     Historical Provider, MD   Omega-3 Fatty Acids (FISH OIL) 1200 MG CAPS Take by mouth every morning     Historical Provider, MD   levothyroxine (SYNTHROID) 75 MCG tablet Take 75 mcg by mouth Daily.     Historical Provider, MD   timolol (BETIMOL) 0.5 % ophthalmic solution Place 1 drop into both eyes 2 times daily     Historical Provider, MD       Current medications:    Current Facility-Administered Medications   Medication Dose Route Frequency Provider Last Rate Last Admin    0.9 % sodium chloride infusion   IntraVENous Continuous Tino Nephsuman, DO           Allergies: Allergies   Allergen Reactions    Latex Rash       Problem List:    Patient Active Problem List   Diagnosis Code    Kidney stone N20.0       Past Medical History:        Diagnosis Date    Cancer [facility]) 2005    breast ca - lumpectomy & radiation    Fibroids     History of kidney stones     Hyperlipidemia     Hypertension     Kidney stones     Shingles 03/2021    Shingles     Thyroid disorder        Past Surgical History:        Procedure Laterality Date    APPENDECTOMY  1987    with hysteretomy    BREAST LUMPECTOMY Left 2004? Dr. Zuñiga Beat Bilateral 2007    COLONOSCOPY  2017    Dr. Bailey Ruiz Left     nerve reattachment -Dr. Arleen Solorio, TOTAL ABDOMINAL ([address] [facility] [address])  1987    LITHOTRIPSY      x3 with Dr. Castellanos Smart LITHOTRIPSY Right 08/03/2017    RIGHT ESWL EXTRACORPEAL SHOCK WAVE LITHOTRIPSY performed by Fe Ross MD at [address] [place] [address] Drive LITHOTRIPSY Left 09/01/2017    LEFT ESWL [address] LITHOTRIPSY performed by Fe Ross MD at [facility]      as a child    URETER SURGERY Left 02/25/2022    CYSTOSCOPY LEFT URETEROSCOPY LASER LITHOTRIPSY BASKET RETRIEVAL OF STONE FRAGMENTS POSSIBLE LEFT URETERAL STENT PLACEMENT performed by Ellis De Oliveira MD at [address] [facility] [facility] EXTRACTION         Social History:    Social History     Tobacco Use    Smoking status: Never     Passive exposure: Past (at work)    Smokeless tobacco: Never   Substance Use Topics    Alcohol use:  No                                Counseling given: Not Answered      Vital Signs (Current):   Vitals:    10/13/22 1249 10/20/22 0747   BP:  (!) 122/58   Pulse:  51   Resp:  16   Temp:  97.5 °F (36.4 °C)   TempSrc:  Temporal   SpO2:  97%   Weight: 154 lb (69.9 kg) 151 lb (68.5 kg)   Height: 4' 9\" (1.448 m) 4' 9\" (1.448 m)                                              BP Readings from Last 3 Encounters:   10/20/22 (!) 122/58   04/26/22 (!) 144/78   02/25/22 (!) 140/65       NPO Status: Time of last liquid consumption: 2300                        Time of last solid consumption: 1900                        Date of last liquid consumption: 10/19/22                        Date of last solid food consumption: 10/19/22    BMI:   Wt Readings from Last 3 Encounters:   10/20/22 151 lb (68.5 kg)   04/26/22 157 lb (71.2 kg)   02/25/22 159 lb 6.4 oz (72.3 kg)     Body mass index is 32.68 kg/m². CBC:   Lab Results   Component Value Date/Time    WBC 6.5 06/09/2022 10:00 AM    RBC 5.13 06/09/2022 10:00 AM    HGB 14.8 06/09/2022 10:00 AM    HCT 46.6 06/09/2022 10:00 AM    MCV 90.8 06/09/2022 10:00 AM    RDW 14.1 07/28/2017 12:00 AM     06/09/2022 10:00 AM       CMP:   Lab Results   Component Value Date/Time     06/09/2022 10:00 AM    K 4.8 06/09/2022 10:00 AM     06/09/2022 10:00 AM    CO2 24 06/09/2022 10:00 AM    BUN 21 06/09/2022 10:00 AM    CREATININE 0.6 06/09/2022 10:00 AM    LABGLOM >90 06/09/2022 10:00 AM    GLUCOSE 87 06/09/2022 10:00 AM    PROT 8.0 02/13/2022 05:00 AM    CALCIUM 10.1 06/09/2022 10:00 AM    BILITOT 0.5 02/13/2022 05:00 AM    ALKPHOS 111 02/13/2022 05:00 AM    AST 17 02/13/2022 05:00 AM    ALT 25 02/13/2022 05:00 AM       POC Tests: No results for input(s): POCGLU, POCNA, POCK, POCCL, POCBUN, POCHEMO, POCHCT in the last 72 hours.     Coags:   Lab Results   Component Value Date/Time    INR 0.91 09/01/2017 12:22 PM       HCG (If Applicable): No results found for: PREGTESTUR, PREGSERUM, HCG, HCGQUANT     ABGs: No results found for: PHART, PO2ART, IKW6CZU, VWO6ELX, BEART, J4VMSPQJ     Type & Screen (If Applicable):  No results found for: LABABO, LABRH    Drug/Infectious Status (If Applicable):  No results found for: HIV, HEPCAB    COVID-19 Screening (If Applicable): No results found for: COVID19        Anesthesia Evaluation  Patient summary reviewed no history of anesthetic complications:   Airway: Mallampati: II  TM distance: >3 FB   Neck ROM: full  Mouth opening: > = 3 FB   Dental: normal exam         Pulmonary:normal exam                               Cardiovascular:    (+) hypertension:, hyperlipidemia                  Neuro/Psych:               GI/Hepatic/Renal:   (+) renal disease:,           Endo/Other:    (+) malignancy/cancer. Abdominal:   (+) obese,           Vascular: Other Findings:           Anesthesia Plan      MAC     ASA 2       Induction: intravenous. MIPS: prophylactic pharmacologic antiemetic agents not administered perioperatively for documented reasons. Anesthetic plan and risks discussed with patient.       Plan discussed with CRNA and surgical team.                    Isra Chavez MD   10/20/2022

## 2022-10-20 NOTE — ANESTHESIA POSTPROCEDURE EVALUATION
Department of Anesthesiology  Postprocedure Note    Patient: Ashely Rawls  MRN: [id number]  YOB: 1945  Date of evaluation: 10/20/2022      Procedure Summary     Date: 10/20/22 Room / Location: [facility] 04 / [address] [facility]    Anesthesia Start: 2791 Anesthesia Stop: 0454    Procedure: LEFT EYE CATARACT EMULSIFICATION IOL IMPLANT, POSSIBLE GONIOTOMY, POSSIBLE CANALPLASTY (Left) Diagnosis:       Cortical age-related cataract of left eye      (Cortical age-related cataract of left eye [H25.012])    Surgeons: Preston Bueno DO Responsible Provider: Santino Aleman MD    Anesthesia Type: MAC ASA Status: 2          Anesthesia Type: No value filed.     Yonathan Phase I:      Yonathan Phase II:        Anesthesia Post Evaluation    Patient location during evaluation: bedside  Patient participation: complete - patient participated  Level of consciousness: awake  Airway patency: patent  Nausea & Vomiting: no vomiting and no nausea  Complications: no  Cardiovascular status: hemodynamically stable  Respiratory status: acceptable  Hydration status: stable

## 2022-10-28 NOTE — PROGRESS NOTES
Patient just had surgery at the surgery center 10/10 so instructions only reviewed  NPO after midnight  Bring eye bag from office  Bring insurance info and drivers license  Wear comfortable clean clothing, button down top  Do not bring jewelry  Shower night before and morning of surgery with a liquid antibacterial soap  Bring list of medications with dosage and how often taken  Follow all instructions given by your physician   needed at discharge  Call [phone] for any questions

## 2022-11-03 ENCOUNTER — ANESTHESIA (OUTPATIENT)
Dept: OPERATING ROOM | Age: 77
End: 2022-11-03
Payer: MEDICARE

## 2022-11-03 ENCOUNTER — HOSPITAL ENCOUNTER (OUTPATIENT)
Age: 77
Setting detail: OUTPATIENT SURGERY
Discharge: HOME | End: 2022-11-03
Attending: OPHTHALMOLOGY | Admitting: OPHTHALMOLOGY
Payer: MEDICARE

## 2022-11-03 ENCOUNTER — ANESTHESIA EVENT (OUTPATIENT)
Dept: OPERATING ROOM | Age: 77
End: 2022-11-03
Payer: MEDICARE

## 2022-11-03 VITALS
DIASTOLIC BLOOD PRESSURE: 63 MMHG | BODY MASS INDEX: 32.92 KG/M2 | RESPIRATION RATE: 16 BRPM | HEIGHT: 57 IN | OXYGEN SATURATION: 98 % | HEART RATE: 43 BPM | SYSTOLIC BLOOD PRESSURE: 131 MMHG | WEIGHT: 152.6 LBS | TEMPERATURE: 96.5 F

## 2022-11-03 PROCEDURE — 2500000003 HC RX 250 WO HCPCS: Performed by: OPHTHALMOLOGY

## 2022-11-03 PROCEDURE — 3700000001 HC ADD 15 MINUTES (ANESTHESIA): Performed by: OPHTHALMOLOGY

## 2022-11-03 PROCEDURE — 3700000000 HC ANESTHESIA ATTENDED CARE: Performed by: OPHTHALMOLOGY

## 2022-11-03 PROCEDURE — V2632 POST CHMBR INTRAOCULAR LENS: HCPCS | Performed by: OPHTHALMOLOGY

## 2022-11-03 PROCEDURE — C1783 OCULAR IMP, AQUEOUS DRAIN DE: HCPCS | Performed by: OPHTHALMOLOGY

## 2022-11-03 PROCEDURE — 2720000010 HC SURG SUPPLY STERILE: Performed by: OPHTHALMOLOGY

## 2022-11-03 PROCEDURE — 2709999900 HC NON-CHARGEABLE SUPPLY: Performed by: OPHTHALMOLOGY

## 2022-11-03 PROCEDURE — 7100000011 HC PHASE II RECOVERY - ADDTL 15 MIN: Performed by: OPHTHALMOLOGY

## 2022-11-03 PROCEDURE — 2580000003 HC RX 258: Performed by: OPHTHALMOLOGY

## 2022-11-03 PROCEDURE — 3600000002 HC SURGERY LEVEL 2 BASE: Performed by: OPHTHALMOLOGY

## 2022-11-03 PROCEDURE — 7100000010 HC PHASE II RECOVERY - FIRST 15 MIN: Performed by: OPHTHALMOLOGY

## 2022-11-03 PROCEDURE — 6370000000 HC RX 637 (ALT 250 FOR IP): Performed by: OPHTHALMOLOGY

## 2022-11-03 PROCEDURE — 3600000012 HC SURGERY LEVEL 2 ADDTL 15MIN: Performed by: OPHTHALMOLOGY

## 2022-11-03 PROCEDURE — 6360000002 HC RX W HCPCS: Performed by: OPHTHALMOLOGY

## 2022-11-03 PROCEDURE — 6360000002 HC RX W HCPCS: Performed by: REGISTERED NURSE

## 2022-11-03 DEVICE — ACRYSOF(R) IQ ASPHERIC NATURAL IOL, SINGLE-PIECE ACRYLIC FOLDABLE PCL, UV WITH BLUE LIGHTFILTER, 13.0MM LENGTH, 6.0MM ANTERIORASYMMETRIC BICONVEX OPTIC, PLANAR HAPTICS.
Type: IMPLANTABLE DEVICE | Site: EYE | Status: FUNCTIONAL
Brand: ACRYSOF®

## 2022-11-03 DEVICE — SYSTEM MIC BYPS AD H360UM DIA230UM STEARALKONIUM HEP TI: Type: IMPLANTABLE DEVICE | Site: EYE | Status: FUNCTIONAL

## 2022-11-03 RX ORDER — LIDOCAINE HYDROCHLORIDE 10 MG/ML
INJECTION, SOLUTION EPIDURAL; INFILTRATION; INTRACAUDAL; PERINEURAL PRN
Status: DISCONTINUED | OUTPATIENT
Start: 2022-11-03 | End: 2022-11-03 | Stop reason: ALTCHOICE

## 2022-11-03 RX ORDER — OFLOXACIN 3 MG/ML
SOLUTION/ DROPS OPHTHALMIC PRN
Status: DISCONTINUED | OUTPATIENT
Start: 2022-11-03 | End: 2022-11-03 | Stop reason: ALTCHOICE

## 2022-11-03 RX ORDER — SODIUM CHLORIDE 9 MG/ML
INJECTION, SOLUTION INTRAVENOUS CONTINUOUS
Status: DISCONTINUED | OUTPATIENT
Start: 2022-11-03 | End: 2022-11-03 | Stop reason: HOSPADM

## 2022-11-03 RX ORDER — BUPIVACAINE HYDROCHLORIDE 7.5 MG/ML
1 INJECTION, SOLUTION EPIDURAL; RETROBULBAR EVERY 5 MIN PRN
Status: COMPLETED | OUTPATIENT
Start: 2022-11-03 | End: 2022-11-03

## 2022-11-03 RX ORDER — FENTANYL CITRATE 50 UG/ML
INJECTION, SOLUTION INTRAMUSCULAR; INTRAVENOUS PRN
Status: DISCONTINUED | OUTPATIENT
Start: 2022-11-03 | End: 2022-11-03 | Stop reason: SDUPTHER

## 2022-11-03 RX ORDER — MIDAZOLAM HYDROCHLORIDE 1 MG/ML
INJECTION INTRAMUSCULAR; INTRAVENOUS PRN
Status: DISCONTINUED | OUTPATIENT
Start: 2022-11-03 | End: 2022-11-03 | Stop reason: SDUPTHER

## 2022-11-03 RX ADMIN — Medication 1 DROP: at 07:00

## 2022-11-03 RX ADMIN — BUPIVACAINE HYDROCHLORIDE 0.38 MG: 7.5 INJECTION, SOLUTION EPIDURAL; RETROBULBAR at 07:00

## 2022-11-03 RX ADMIN — MIDAZOLAM 1 MG: 1 INJECTION INTRAMUSCULAR; INTRAVENOUS at 07:22

## 2022-11-03 RX ADMIN — Medication 1 DROP: at 07:05

## 2022-11-03 RX ADMIN — Medication 1 DROP: at 07:10

## 2022-11-03 RX ADMIN — BUPIVACAINE HYDROCHLORIDE 0.38 MG: 7.5 INJECTION, SOLUTION EPIDURAL; RETROBULBAR at 07:05

## 2022-11-03 RX ADMIN — BUPIVACAINE HYDROCHLORIDE 0.38 MG: 7.5 INJECTION, SOLUTION EPIDURAL; RETROBULBAR at 07:10

## 2022-11-03 RX ADMIN — SODIUM CHLORIDE: 9 INJECTION, SOLUTION INTRAVENOUS at 07:18

## 2022-11-03 RX ADMIN — FENTANYL CITRATE 25 MCG: 50 INJECTION, SOLUTION INTRAMUSCULAR; INTRAVENOUS at 07:22

## 2022-11-03 ASSESSMENT — PAIN - FUNCTIONAL ASSESSMENT: PAIN_FUNCTIONAL_ASSESSMENT: 0-10

## 2022-11-03 NOTE — PROGRESS NOTES
2710: Patient arrived to Phase II recovery via chair. 9798: VSS, patient denies pain. Eye patch present to R eye. Snack and drink provided. Friend brought to room. Call light in reach. 0249: Patient denies nausea. Discharge instructions reviewed with patient and patient's friend, Mayra Walker. AVS and drop schedule provided for discharge. IV removed without complications and band aid applied to site. Patient stood and dressed independently in room. 8693: Patient ambulatory to vehicle and discharged home in stable condition with friend.

## 2022-11-03 NOTE — OP NOTE
[facility]  RECORD OF OPERATION                       11/3/2022     Patient: Carolin Delong                  : 1945                    Acct#: [de-identified]     PRE-OPERATIVE DIAGNOSIS:  Cataract, OD. Primary Open Angle Glaucoma (Mild Stage), OD     POST-OPERATIVE DIAGNOSIS:  same     OPERATION PERFORMED:  Traditional phacoemulsification cataract extraction with posterior chamber intraocular lens placement, OD. Trabecular Meshwork Bypass Stenting, OD. SURGEON:  Alyssa Duarte DO     ANESTHESIA:  Topical/MAC     COMPLICATIONS:  None    MODIFIER: Non-complex        LENS IMPLANT INFORMATION: +19.0 D (SN: [id number])     TRABECULAR MESHWORK STENT (IMPLANT) INFORMATION: iStent inject W x 2 (SN: [id number])     CDE: 2.24      INDICATION AND CONSENT:  The patient was found to have a visually significant cataract affecting their activities of daily living which is not adequately correctable by a change in spectacles. The risks, benefits, and alternative options of cataract surgery including observation were discussed. Consent was obtained and the patient requests to proceed with cataract surgery. In addition, the patient was found to have glaucoma with uncontrolled intraocular pressures and/or glaucoma with a desire to reduce medication burden. The risks, benefits, and alternative options of combined canaloplasty with trabecular meshwork stenting including medications and observation were discussed. The patient expressed a desire to proceed with cataract surgery, as well as, canaloplasty with trabecular meshwork stenting and consent was obtained. OPERATIVE TECHNIQUE: In the preoperative area, the patient was prepared for surgery. The patient was then transferred to the operation cart. The patient was then taken to the operating room, the operative eye was prepped and draped in the usual sterile ophthalmic fashion.  A final timeout was perform to confirm the patient, site, side, lens, and procedure with the consent and pre-operative paperwork. A lid speculum was inserted. A paracentesis incision was made at the limbus in a position comfortable for the non-dominate hand. 0.2-0.4cc of 1% lidocaine was instilled into the anterior chamber followed by 0.2-0.4cc of Omidria. Viscoelastic was instilled into the anterior chamber. A keratome was used to produce a temporal clear corneal incision at the temporal limbus. A capsulorrhexis-type capsulotomy was performed. Hydrodissection was performed balanced salt solution (BSS). The lens nucleus was phacoemulsified. The lens cortical material was aspirated using the automated irrigation/aspiration unit (I/A). Additional viscoelastic was instilled into the anterior segment and capsular bag. An intraocular lens was introduced and centered within the capsular bag. The patient positioning and microscope were adjusted for gonioscopy. Additional provisc was placed on the eye and a gonio prism was utilized to view the trabecular meshwork. Additional provisc was injected into the angle. A Omni system was utilized to engage the nasal trabecular meshwork directed superiorly and a 180 degree viscodilation of the Schlem's canal was conducted. The Omni system was withdrawn from the eye. Additional provisc was injected into the nasal angle. A  iStent inject W trabecular meshwork stent was placed in the nasal trabecular meshwork superior nasal followed by a second stent inferior nasal.  The patient positioning and microscope were return to primary position. The viscoelastic was removed and replaced with BSS using the I/A. The wounds were hydrated with BSS. The tension of the globe was adjusted with BSS. The incisions were watertight and the lens in desired postion. The lid speculum was removed. Post-operative drops were placed on the eye.  The patient tolerated the procedure well and was taken from the operating room in good

## 2022-11-03 NOTE — DISCHARGE INSTRUCTIONS
Cataract Post-Operative Care Instructions     How to Instill you Eye Drops:    Wash your hands before instilling drops   Shake eye drop bottle putting one drop in the surgical eye   The dropper tip should not touch the eyelashes or the eye. Your head should be tilted back, look up and lower the eyelid pulled down from the cheekbone to form a pocket for the eye drop. Daily Eye Drop Schedule:    Remove the eye patch as directed by the recovery room nurse. Apply 1 drop of Ofloxacin three times today, then four times a day starting tomorrow for one week. Lay a warm, clean and moist washcloth for 5 minutes on operative eye. Apply 1 drop of Lotemax two times today, and then starting tomorrow three times a day for one week, two times a day for one week and then once a day for one week. Lay a warm, clean and moist washcloth for 5 minutes on operative eye. Apply 1 drop of Prolensa one time a day starting tomorrow for two weeks. Lay a warm, clean and moist washcloth for 5 minutes on operative eye. Care at Home:     Wear a shield at bedtime for one week following your eye surgery. NEVER RUB YOUR OPERATIVE EYE! Use of the operative eye is NOT harmful. Your vision may be blurry with your present glasses. Take your glasses to your follow-up appointment the day after surgery. You will receive your new glasses prescription approximately 4-5 weeks following surgery. You may do everything to care for yourself. You may sleep on your back or either side after surgery, but NOT FACE DOWN ON YOUR STOMACH. NO LIFTING OVER 10 POUNDS. No outdoor work until your doctor tells you that it is OK. Light work, including stooping over is not harmful. If you have glaucoma, continue your normal use of the glaucoma drops. Do not submerge head under water (Hot tub/swimming pool)    Please call office if any problem arise at [phone] [phone].     I have had the opportunity to ask questions and have received understandable answers to my questions. I understand these instructions and have received a copy for my use.

## 2022-11-03 NOTE — H&P
I have examined the patient and reviewed the H&P / Consult and there are no changes to the patient.     Preston Bueno DO 11/3/99784:01 AM

## 2022-11-03 NOTE — ANESTHESIA POSTPROCEDURE EVALUATION
Department of Anesthesiology  Postprocedure Note    Patient: Jere Esquivel  MRN: [id number]  YOB: 1945  Date of evaluation: 11/3/2022      Procedure Summary     Date: 11/03/22 Room / Location: [address] [place] [address] 05 / 138 [facility]    Anesthesia Start: 1765 Anesthesia Stop: 5261    Procedure: RIGHT EYE CATARACT EMULSIFICATION IOL IMPLANT; RIGHT EYE CANULOPLASTY AND PLACEMENT OF ISTENT (Right: Eye) Diagnosis:       Cataract of right eye, unspecified cataract type      (Cataract of right eye, unspecified cataract type [H26.9])    Surgeons: Joaquin Akhtar DO Responsible Provider: Anoop Hearn MD    Anesthesia Type: MAC ASA Status: 3          Anesthesia Type: No value filed.     Yonathan Phase I:      Yonathan Phase II: Yonathan Score: 10      Anesthesia Post Evaluation    Complications: no

## 2022-11-03 NOTE — ANESTHESIA PRE PROCEDURE
Department of Anesthesiology  Preprocedure Note       Name:  Jass Engle   Age:  78 y.o.  :  1945                                          MRN:  [id number]         Date:  11/3/2022      Surgeon: Olga Huff):  Tamiko Martins DO    Procedure: Procedure(s):  RIGHT EYE CATARACT EMULSIFICATION IOL IMPLANT    Medications prior to admission:   Prior to Admission medications    Medication Sig Start Date End Date Taking? Authorizing Provider   losartan (COZAAR) 100 MG tablet Take 100 mg by mouth daily    Historical Provider, MD   Iodine Strong, Lugols, (IODINE STRONG PO) Take by mouth    Historical Provider, MD   LUTEIN PO Take by mouth    Historical Provider, MD   MAGNESIUM PO Take 250 mg by mouth    Historical Provider, MD   B Complex Vitamins (VITAMIN B COMPLEX PO) Take by mouth daily    Historical Provider, MD   Acetylcysteine (N-ACETYL-L-CYSTEINE PO) Take by mouth daily    Historical Provider, MD   OIL OF OREGANO PO Take 150 mg by mouth daily    Historical Provider, MD   zinc 50 MG CAPS Take 25 mg by mouth daily    Historical Provider, MD   Cholecalciferol (VITAMIN D3 PO) Take 5,000 Units by mouth daily    Historical Provider, MD   GRAPE SEED EXTRACT PO Take 150 mg by mouth every morning    Historical Provider, MD   Probiotic Product (PROBIOTIC DAILY PO) Take by mouth every morning     Historical Provider, MD   OLIVE LEAF PO Take by mouth every morning     Historical Provider, MD   MILK THISTLE PO Take by mouth every morning     Historical Provider, MD   Omega-3 Fatty Acids (FISH OIL) 1200 MG CAPS Take by mouth every morning     Historical Provider, MD   levothyroxine (SYNTHROID) 75 MCG tablet Take 75 mcg by mouth Daily.     Historical Provider, MD   timolol (BETIMOL) 0.5 % ophthalmic solution Place 1 drop into both eyes 2 times daily     Historical Provider, MD       Current medications:    Current Facility-Administered Medications   Medication Dose Route Frequency Provider Last Rate Last Admin    0.9 % sodium chloride infusion   IntraVENous Continuous Lorenzo Cobos DO           Allergies: Allergies   Allergen Reactions    Latex Rash       Problem List:    Patient Active Problem List   Diagnosis Code    Kidney stone N20.0       Past Medical History:        Diagnosis Date    Cancer [facility]) 2005    breast ca - lumpectomy & radiation    Fibroids     History of kidney stones     Hyperlipidemia     Hypertension     Kidney stones     Shingles 03/2021    Shingles     Thyroid disorder        Past Surgical History:        Procedure Laterality Date    APPENDECTOMY  1987    with hysteretomy    BREAST LUMPECTOMY Left 2004? Dr. Jumana Walter Bilateral 2007    COLONOSCOPY  2017    Dr. Bernabe Simpler Left     nerve reattachment -Dr. Emi Jaime, TOTAL ABDOMINAL (CERVIX REMOVED)  1987    INTRACAPSULAR CATARACT EXTRACTION Left 10/20/2022    LEFT EYE CATARACT EMULSIFICATION IOL IMPLANT,  GONIOTOMY,  CANALPLASTY performed by Lorenzo Cobos DO at [facility] LITHOTRIPSY      x3 with Dr. Trevor Hughes LITHOTRIPSY Right 08/03/2017    RIGHT ESWL EXTRACORPEAL SHOCK WAVE LITHOTRIPSY performed by Jackie Valera MD at [address] [place] [address] LITHOTRIPSY Left 09/01/2017    LEFT ESWL [address] LITHOTRIPSY performed by Jackie Valera MD at [facility]      as a child    URETER SURGERY Left 02/25/2022    CYSTOSCOPY LEFT URETEROSCOPY LASER LITHOTRIPSY BASKET RETRIEVAL OF STONE FRAGMENTS POSSIBLE LEFT URETERAL STENT PLACEMENT performed by Duarte Beckwith MD at [facility]         Social History:    Social History     Tobacco Use    Smoking status: Never     Passive exposure: Past (at work)    Smokeless tobacco: Never   Substance Use Topics    Alcohol use:  No                                Counseling given: Not Answered      Vital Signs (Current):   Vitals:    10/28/22 1339 11/03/22 0657   BP:  131/60   Pulse:  56   Resp:  16   Temp:  97.3 °F (36.3 °C)   TempSrc:  Temporal   SpO2:  98%   Weight: 151 lb (68.5 kg) 152 lb 9.6 oz (69.2 kg)   Height: 4' 9\" (1.448 m) 4' 9\" (1.448 m)                                              BP Readings from Last 3 Encounters:   11/03/22 131/60   10/20/22 126/60   04/26/22 (!) 144/78       NPO Status: Time of last liquid consumption: 2300                        Time of last solid consumption: 2000                        Date of last liquid consumption: 11/02/22                        Date of last solid food consumption: 11/02/22    BMI:   Wt Readings from Last 3 Encounters:   11/03/22 152 lb 9.6 oz (69.2 kg)   10/20/22 151 lb (68.5 kg)   04/26/22 157 lb (71.2 kg)     Body mass index is 33.02 kg/m². CBC:   Lab Results   Component Value Date/Time    WBC 6.5 06/09/2022 10:00 AM    RBC 5.13 06/09/2022 10:00 AM    HGB 14.8 06/09/2022 10:00 AM    HCT 46.6 06/09/2022 10:00 AM    MCV 90.8 06/09/2022 10:00 AM    RDW 14.1 07/28/2017 12:00 AM     06/09/2022 10:00 AM       CMP:   Lab Results   Component Value Date/Time     06/09/2022 10:00 AM    K 4.8 06/09/2022 10:00 AM     06/09/2022 10:00 AM    CO2 24 06/09/2022 10:00 AM    BUN 21 06/09/2022 10:00 AM    CREATININE 0.6 06/09/2022 10:00 AM    LABGLOM >90 06/09/2022 10:00 AM    GLUCOSE 87 06/09/2022 10:00 AM    PROT 8.0 02/13/2022 05:00 AM    CALCIUM 10.1 06/09/2022 10:00 AM    BILITOT 0.5 02/13/2022 05:00 AM    ALKPHOS 111 02/13/2022 05:00 AM    AST 17 02/13/2022 05:00 AM    ALT 25 02/13/2022 05:00 AM       POC Tests: No results for input(s): POCGLU, POCNA, POCK, POCCL, POCBUN, POCHEMO, POCHCT in the last 72 hours.     Coags:   Lab Results   Component Value Date/Time    INR 0.91 09/01/2017 12:22 PM       HCG (If Applicable): No results found for: PREGTESTUR, PREGSERUM, HCG, HCGQUANT     ABGs: No results found for: PHART, PO2ART, AKL8VDU, IQM6LSM, BEART, B8TSMVGQ     Type & Screen (If Applicable):  No results found for: LABABO, LABRH    Drug/Infectious Status (If Applicable):  No results found for: HIV, HEPCAB    COVID-19 Screening (If Applicable): No results found for: COVID19        Anesthesia Evaluation    Airway: Mallampati: II          Dental:          Pulmonary:       (-) COPD                           Cardiovascular:    (+) hypertension:,                   Neuro/Psych:               GI/Hepatic/Renal:             Endo/Other:                     Abdominal:             Vascular: Other Findings:           Anesthesia Plan      MAC     ASA 3             Anesthetic plan and risks discussed with patient.                         Faby Russell MD   11/3/2022

## 2022-11-03 NOTE — PROGRESS NOTES
1 DROP PROPARACAINE TO RIGHT EYE PRIOR TO PREP    0.3ML VIGAMOX TO RIGHT EYE AT END OF PROCEDURE BY DR. Sahil Norris    CDE 2.24    1 DROP LOTEMAX AND 1 DROP OFLOXACIN TO RIGHT EYE AT END OF PROCEDURE    EYE SHIELD SECURED OVER RIGHT EYE WITH PAPER TAPE

## 2023-06-08 ENCOUNTER — HOSPITAL ENCOUNTER (OUTPATIENT)
Age: 78
Discharge: HOME | End: 2023-06-08
Payer: MEDICARE

## 2023-06-08 LAB
25(OH)D3 SERPL-MCNC: 32 NG/ML (ref 30–100)
ALT SERPL W/O P-5'-P-CCNC: 25 U/L (ref 11–66)
ANION GAP SERPL CALC-SCNC: 10 MEQ/L (ref 8–16)
BUN SERPL-MCNC: 18 MG/DL (ref 7–22)
CALCIUM SERPL-MCNC: 9.8 MG/DL (ref 8.5–10.5)
CHLORIDE SERPL-SCNC: 103 MEQ/L (ref 98–111)
CHOLEST SERPL-MCNC: 280 MG/DL (ref 100–199)
CO2 SERPL-SCNC: 25 MEQ/L (ref 23–33)
CREAT SERPL-MCNC: 0.7 MG/DL (ref 0.4–1.2)
DEPRECATED RDW RBC AUTO: 46.7 FL (ref 35–45)
ERYTHROCYTE [DISTWIDTH] IN BLOOD BY AUTOMATED COUNT: 14.2 % (ref 11.5–14.5)
GFR SERPL CREATININE-BSD FRML MDRD: > 60 ML/MIN/1.73M2
GLUCOSE SERPL-MCNC: 86 MG/DL (ref 70–108)
HCT VFR BLD AUTO: 47.4 % (ref 37–47)
HDLC SERPL-MCNC: 46 MG/DL
HGB BLD-MCNC: 15.1 GM/DL (ref 12–16)
LDLC SERPL CALC-MCNC: 197 MG/DL
MCH RBC QN AUTO: 28.9 PG (ref 26–33)
MCHC RBC AUTO-ENTMCNC: 31.9 GM/DL (ref 32.2–35.5)
MCV RBC AUTO: 90.8 FL (ref 81–99)
PLATELET # BLD AUTO: 209 THOU/MM3 (ref 130–400)
PMV BLD AUTO: 12.2 FL (ref 9.4–12.4)
POTASSIUM SERPL-SCNC: 4.3 MEQ/L (ref 3.5–5.2)
RBC # BLD AUTO: 5.22 MILL/MM3 (ref 4.2–5.4)
SODIUM SERPL-SCNC: 138 MEQ/L (ref 135–145)
TRIGL SERPL-MCNC: 186 MG/DL (ref 0–199)
WBC # BLD AUTO: 5.6 THOU/MM3 (ref 4.8–10.8)

## 2023-06-08 PROCEDURE — 85027 COMPLETE CBC AUTOMATED: CPT

## 2023-06-08 PROCEDURE — 80061 LIPID PANEL: CPT

## 2023-06-08 PROCEDURE — 82306 VITAMIN D 25 HYDROXY: CPT

## 2023-06-08 PROCEDURE — 84460 ALANINE AMINO (ALT) (SGPT): CPT

## 2023-06-08 PROCEDURE — 36415 COLL VENOUS BLD VENIPUNCTURE: CPT

## 2023-06-08 PROCEDURE — 80048 BASIC METABOLIC PNL TOTAL CA: CPT

## 2023-12-05 ENCOUNTER — HOSPITAL ENCOUNTER (OUTPATIENT)
Age: 78
Discharge: HOME | End: 2023-12-05
Payer: MEDICARE

## 2023-12-05 LAB
ALT SERPL W/O P-5'-P-CCNC: 26 U/L (ref 11–66)
ANION GAP SERPL CALC-SCNC: 12 MEQ/L (ref 8–16)
BUN SERPL-MCNC: 19 MG/DL (ref 7–22)
CALCIUM SERPL-MCNC: 10.9 MG/DL (ref 8.5–10.5)
CHLORIDE SERPL-SCNC: 105 MEQ/L (ref 98–111)
CHOLEST SERPL-MCNC: 292 MG/DL (ref 100–199)
CO2 SERPL-SCNC: 24 MEQ/L (ref 23–33)
CREAT SERPL-MCNC: 0.7 MG/DL (ref 0.4–1.2)
GFR SERPL CREATININE-BSD FRML MDRD: > 60 ML/MIN/1.73M2
GLUCOSE SERPL-MCNC: 87 MG/DL (ref 70–108)
HDLC SERPL-MCNC: 38 MG/DL
LDLC SERPL CALC-MCNC: 218 MG/DL
POTASSIUM SERPL-SCNC: 4.9 MEQ/L (ref 3.5–5.2)
SODIUM SERPL-SCNC: 141 MEQ/L (ref 135–145)
TRIGL SERPL-MCNC: 182 MG/DL (ref 0–199)

## 2023-12-05 PROCEDURE — 80048 BASIC METABOLIC PNL TOTAL CA: CPT

## 2023-12-05 PROCEDURE — 84460 ALANINE AMINO (ALT) (SGPT): CPT

## 2023-12-05 PROCEDURE — 36415 COLL VENOUS BLD VENIPUNCTURE: CPT

## 2023-12-05 PROCEDURE — 80061 LIPID PANEL: CPT

## 2024-05-24 ENCOUNTER — HOSPITAL ENCOUNTER (OUTPATIENT)
Dept: INTERVENTIONAL RADIOLOGY/VASCULAR | Age: 79
Discharge: HOME | End: 2024-05-24

## 2024-05-24 ENCOUNTER — HOSPITAL ENCOUNTER (OUTPATIENT)
Dept: GENERAL RADIOLOGY | Age: 79
Discharge: HOME | End: 2024-05-24

## 2024-05-24 ENCOUNTER — HOSPITAL ENCOUNTER (OUTPATIENT)
Age: 79
Discharge: HOME | End: 2024-05-24

## 2024-05-24 DIAGNOSIS — E83.52 HYPERCALCEMIA: ICD-10-CM

## 2024-05-24 DIAGNOSIS — Z13.6 ENCOUNTER FOR SCREENING FOR VASCULAR DISEASE: ICD-10-CM

## 2024-05-24 PROCEDURE — 74018 RADEX ABDOMEN 1 VIEW: CPT

## 2024-05-24 PROCEDURE — 9900000021 US VASCULAR SCREENING

## 2024-05-30 ENCOUNTER — HOSPITAL ENCOUNTER (OUTPATIENT)
Age: 79
Discharge: HOME | End: 2024-05-30
Payer: COMMERCIAL

## 2024-05-30 LAB
ALT SERPL W/O P-5'-P-CCNC: 26 U/L (ref 11–66)
ANION GAP SERPL CALC-SCNC: 10 MEQ/L (ref 8–16)
BUN SERPL-MCNC: 16 MG/DL (ref 7–22)
CALCIUM SERPL-MCNC: 10.3 MG/DL (ref 8.5–10.5)
CHLORIDE SERPL-SCNC: 104 MEQ/L (ref 98–111)
CHOLEST SERPL-MCNC: 273 MG/DL (ref 100–199)
CO2 SERPL-SCNC: 25 MEQ/L (ref 23–33)
CREAT SERPL-MCNC: 0.7 MG/DL (ref 0.4–1.2)
GFR SERPL CREATININE-BSD FRML MDRD: 89 ML/MIN/1.73M2
GLUCOSE SERPL-MCNC: 89 MG/DL (ref 70–108)
HDLC SERPL-MCNC: 36 MG/DL
IONIZED CALCIUM, WHOLE BLOOD: 1.33 MMOL/L (ref 1.12–1.32)
LDLC SERPL CALC-MCNC: 196 MG/DL
POTASSIUM SERPL-SCNC: 4.5 MEQ/L (ref 3.5–5.2)
PTH-INTACT SERPL-MCNC: 53.8 PG/ML (ref 15–65)
SODIUM SERPL-SCNC: 139 MEQ/L (ref 135–145)
TRIGL SERPL-MCNC: 204 MG/DL (ref 0–199)

## 2024-05-30 PROCEDURE — 82330 ASSAY OF CALCIUM: CPT

## 2024-05-30 PROCEDURE — 80048 BASIC METABOLIC PNL TOTAL CA: CPT

## 2024-05-30 PROCEDURE — 84460 ALANINE AMINO (ALT) (SGPT): CPT

## 2024-05-30 PROCEDURE — 36415 COLL VENOUS BLD VENIPUNCTURE: CPT

## 2024-05-30 PROCEDURE — 83970 ASSAY OF PARATHORMONE: CPT

## 2024-05-30 PROCEDURE — 80061 LIPID PANEL: CPT

## 2024-11-15 ENCOUNTER — HOSPITAL ENCOUNTER (OUTPATIENT)
Age: 79
Discharge: HOME | End: 2024-11-15
Payer: COMMERCIAL

## 2024-11-15 LAB
ALT SERPL W/O P-5'-P-CCNC: 24 U/L (ref 11–66)
ANION GAP SERPL CALC-SCNC: 10 MEQ/L (ref 8–16)
BUN SERPL-MCNC: 20 MG/DL (ref 7–22)
CALCIUM SERPL-MCNC: 9.9 MG/DL (ref 8.5–10.5)
CHLORIDE SERPL-SCNC: 106 MEQ/L (ref 98–111)
CHOLEST SERPL-MCNC: 228 MG/DL (ref 100–199)
CO2 SERPL-SCNC: 25 MEQ/L (ref 23–33)
CREAT SERPL-MCNC: 0.7 MG/DL (ref 0.4–1.2)
GFR SERPL CREATININE-BSD FRML MDRD: 89 ML/MIN/1.73M2
GLUCOSE SERPL-MCNC: 95 MG/DL (ref 70–108)
HDLC SERPL-MCNC: 46 MG/DL
LDLC SERPL CALC-MCNC: 152 MG/DL
POTASSIUM SERPL-SCNC: 4.3 MEQ/L (ref 3.5–5.2)
SODIUM SERPL-SCNC: 141 MEQ/L (ref 135–145)
TRIGL SERPL-MCNC: 148 MG/DL (ref 0–199)

## 2024-11-15 PROCEDURE — 84460 ALANINE AMINO (ALT) (SGPT): CPT

## 2024-11-15 PROCEDURE — 80048 BASIC METABOLIC PNL TOTAL CA: CPT

## 2024-11-15 PROCEDURE — 80061 LIPID PANEL: CPT

## 2024-11-15 PROCEDURE — 36415 COLL VENOUS BLD VENIPUNCTURE: CPT

## 2025-06-19 ENCOUNTER — HOSPITAL ENCOUNTER (OUTPATIENT)
Age: 80
Discharge: HOME | End: 2025-06-19
Payer: COMMERCIAL

## 2025-06-19 LAB
ALT SERPL W/O P-5'-P-CCNC: 25 U/L (ref 10–35)
ANION GAP SERPL CALC-SCNC: 11 MEQ/L (ref 8–16)
BUN SERPL-MCNC: 16 MG/DL (ref 8–23)
CALCIUM SERPL-MCNC: 10.3 MG/DL (ref 8.8–10.2)
CHLORIDE SERPL-SCNC: 105 MEQ/L (ref 98–111)
CHOLEST SERPL-MCNC: 260 MG/DL (ref 100–199)
CO2 SERPL-SCNC: 25 MEQ/L (ref 22–29)
CREAT SERPL-MCNC: 0.7 MG/DL (ref 0.5–0.9)
DEPRECATED RDW RBC AUTO: 47.4 FL (ref 35–45)
ERYTHROCYTE [DISTWIDTH] IN BLOOD BY AUTOMATED COUNT: 14.3 % (ref 11.5–14.5)
GFR SERPL CREATININE-BSD FRML MDRD: 89 ML/MIN/1.73M2
GLUCOSE FASTING: 79 MG/DL (ref 74–109)
HCT VFR BLD AUTO: 48.8 % (ref 37–47)
HDLC SERPL-MCNC: 45 MG/DL
HGB BLD-MCNC: 15.4 GM/DL (ref 12–16)
IRON SERPL-MCNC: 83 UG/DL (ref 37–145)
LDLC SERPL CALC-MCNC: 186 MG/DL
MCH RBC QN AUTO: 28.7 PG (ref 26–33)
MCHC RBC AUTO-ENTMCNC: 31.6 GM/DL (ref 32.2–35.5)
MCV RBC AUTO: 91 FL (ref 81–99)
PLATELET # BLD AUTO: 190 THOU/MM3 (ref 130–400)
PMV BLD AUTO: 12.3 FL (ref 9.4–12.4)
POTASSIUM SERPL-SCNC: 4.2 MEQ/L (ref 3.5–5.2)
RBC # BLD AUTO: 5.36 MILL/MM3 (ref 4.2–5.4)
SODIUM SERPL-SCNC: 141 MEQ/L (ref 135–145)
TRIGL SERPL-MCNC: 146 MG/DL (ref 0–199)
WBC # BLD AUTO: 6.3 THOU/MM3 (ref 4.8–10.8)

## 2025-06-19 PROCEDURE — 85027 COMPLETE CBC AUTOMATED: CPT

## 2025-06-19 PROCEDURE — 80061 LIPID PANEL: CPT

## 2025-06-19 PROCEDURE — 83540 ASSAY OF IRON: CPT

## 2025-06-19 PROCEDURE — 80048 BASIC METABOLIC PNL TOTAL CA: CPT

## 2025-06-19 PROCEDURE — 84460 ALANINE AMINO (ALT) (SGPT): CPT

## 2025-06-19 PROCEDURE — 36415 COLL VENOUS BLD VENIPUNCTURE: CPT

## (undated) DEVICE — Z INACTIVE USE 2735373 APPLICATOR FBR LAIN COT WOOD TIP ECONOMICAL

## (undated) DEVICE — CYSTO PACK: Brand: MEDLINE INDUSTRIES, INC.

## (undated) DEVICE — MICROSURGICAL INSTRUMENT HYDRODISSECTION CANNULA 25GA, 8MM BEND: Brand: ALCON

## (undated) DEVICE — GLOVE SURG 7.5 11.7IN BEAD CUF LIGHT BRN SENSICARE LTX FREE

## (undated) DEVICE — GUIDEWIRE URO L150CM DIA0.035IN STIFF NIT HYDRPHLC STR TIP

## (undated) DEVICE — SOLUTION BSS 15ML

## (undated) DEVICE — SYSTEM FLD MGMT DIA0.9MM 45DEG ULT BAL VISN ACT INTREPID

## (undated) DEVICE — EYE PAK* 1040 PLASTIC OPHTHALMIC DRAPE INCISE POUCH: Brand: ALCON EYE-PAK

## (undated) DEVICE — DRESSING TRNSPAR W2XL2.75IN FLM SHT SEMIPERMEABLE WIND

## (undated) DEVICE — Device

## (undated) DEVICE — SINGLE ACTION PUMPING SYSTEM

## (undated) DEVICE — ADAPTER URO SCP UROLOK LL

## (undated) DEVICE — CLEARCUT® SIDEPORT KNIFE DUAL BEVEL 1.0MM ANGLED: Brand: CLEARCUT®

## (undated) DEVICE — DEVICE SYSTEM SURGICAL OMNI GEN 2

## (undated) DEVICE — MICROSURGICAL INSTRUMENT ANTERIOR CHAMBER CANNULA 27GA: Brand: ALCON

## (undated) DEVICE — SYSTEM PMP VAC SYR 10CC CONT FLO SGL ACT 1 W VLV SAPS

## (undated) DEVICE — INTREPID® TRANSFORMER IA HP: Brand: INTREPID®

## (undated) DEVICE — SYRINGE, LUER SLIP, STERILE, 1ML: Brand: MEDLINE

## (undated) DEVICE — PHACOEMULSIFICATION PACK COMPACT

## (undated) DEVICE — CLEARCUT® HP2 SLIT KNIFE INTREPID MICRO-COAXIAL SYSTEM 2.4 DB: Brand: CLEARCUT®; INTREPID

## (undated) DEVICE — MARKER,SKIN,WI/RULER AND LABELS: Brand: MEDLINE

## (undated) DEVICE — LOOP LIG SZ 0 L21IN CHROMIC GUT SLDE KNOT DISP FOR

## (undated) DEVICE — BETADINE 5% EYE SOL

## (undated) DEVICE — SINGLE-USE DIGITAL FLEXIBLE URETEROSCOPE: Brand: LITHOVUE

## (undated) DEVICE — CATARACT PACK: Brand: MEDLINE INDUSTRIES, INC.